# Patient Record
Sex: FEMALE | Race: WHITE | Employment: FULL TIME | URBAN - METROPOLITAN AREA
[De-identification: names, ages, dates, MRNs, and addresses within clinical notes are randomized per-mention and may not be internally consistent; named-entity substitution may affect disease eponyms.]

---

## 2017-04-27 ENCOUNTER — HOSPITAL ENCOUNTER (OUTPATIENT)
Dept: WOUND CARE | Age: 47
Discharge: HOME | End: 2017-04-27
Attending: SURGERY
Payer: COMMERCIAL

## 2017-04-27 PROCEDURE — 29580 STRAPPING UNNA BOOT: CPT

## 2017-04-27 PROCEDURE — 99215 OFFICE O/P EST HI 40 MIN: CPT

## 2017-05-05 ENCOUNTER — HOSPITAL ENCOUNTER (OUTPATIENT)
Dept: WOUND CARE | Age: 47
Discharge: HOME | End: 2017-05-05
Attending: SURGERY
Payer: COMMERCIAL

## 2017-05-05 PROCEDURE — 29580 STRAPPING UNNA BOOT: CPT

## 2017-05-11 PROCEDURE — 99213 OFFICE O/P EST LOW 20 MIN: CPT

## 2019-12-19 PROBLEM — E66.01 OBESITY, MORBID (HCC): Status: ACTIVE | Noted: 2019-12-19

## 2020-01-24 ENCOUNTER — HOSPITAL ENCOUNTER (OUTPATIENT)
Dept: MAMMOGRAPHY | Age: 50
Discharge: HOME | End: 2020-01-24
Attending: OBSTETRICS & GYNECOLOGY
Payer: COMMERCIAL

## 2020-01-24 DIAGNOSIS — Z12.39 SCREENING FOR BREAST CANCER: ICD-10-CM

## 2020-01-24 PROCEDURE — 77067 SCR MAMMO BI INCL CAD: CPT

## 2020-05-07 NOTE — H&P (VIEW-ONLY)
Gynecology History and Physical 
 
Name: Emi Servin MRN: [id number] SSN: [id number] YOB: 1970  Age: 52 y.o. Sex: female Subjective: Chief complaint:  Fibroids and Menorrhagia Madison Frankel is a 52 y.o.  female [de-identified] with a history of fibroids and menorrhagia. Gradually worsening course. Graylon Michael Previous workup included Ultrasound which revealed 12 cm fibroid. Previous treatment measures included lupron and aygestin. She is admitted for TLH. OB History   
0 Para Term  AB Living SAB  
   
 TAB Ectopic Molar Multiple Live Births Past Medical History:  
Diagnosis Date  Abnormal Papanicolaou smear of cervix  Cough 2016  Gilbert's syndrome  Headache  HPV (human papilloma virus) infection  Hypersomnia  Narcolepsy without cataplexy(347.00) 2016  Otitis media with effusion 2016 Past Surgical History:  
Procedure Laterality Date  HX CHOLECYSTECTOMY  HX COLPOSCOPY    
 HX LEEP PROCEDURE    HX OTHER SURGICAL    
 cyst on talebone Social History Occupational History  Not on file Tobacco Use  Smoking status: Never Smoker  Smokeless tobacco: Never Used Substance and Sexual Activity  Alcohol use: No  
 Drug use: No  
 Sexual activity: Not Currently Family History Problem Relation Age of Onset  Diabetes Father  Thyroid Disease Father  Heart Disease Father  Stroke Father  Cancer Maternal Grandmother   
     cancer of the [place] Other Mother Gilbert's syndrome  Thyroid Disease Brother  Diabetes Paternal Grandfather  Breast Cancer Neg Hx  Colon Cancer Neg Hx   
 Ovarian Cancer Neg Hx Allergies Allergen Reactions  Pcn [Penicillins] Other (comments) Prior to Admission medications Medication Sig Start Date End Date Taking? Authorizing Provider  
estradioL Rosia Merritts) 10 mcg tab vaginal tablet Insert 1 Tab into vagina daily. 5/5/20  Yes Wilfredo Guzman MD  
norethindrone acetate (AYGESTIN) 5 mg tablet Take 1/2 po every day along with the Lupron injection 1/27/20  Yes Wilfredo Guzman MD  
ergocalciferol (VITAMIN D2) 50,000 unit capsule Take 50,000 Units by mouth. Provider, Historical  
Omega-3-DHA-EPA-Fish Oil 1,000 mg (120 mg-180 mg) cap Take  by mouth. Provider, Historical  
fluticasone (FLONASE) 50 mcg/actuation nasal spray 2 Sprays by Both Nostrils route daily. Provider, Historical  
  
 
Review of Systems: A comprehensive review of systems was negative except for that written in the History of Present Illness. Objective:  
 
Vitals:  
 05/07/20 1508 BP: 110/72 Weight: 256 lb (116.1 kg) Height: 5' 4.5\" (1.638 m) Physical Exam: 
Patient without distress. Heart: Regular rate and rhythm Lung: clear to auscultation throughout lung fields, no wheezes, no rales, no rhonchi and normal respiratory effort Abdomen: soft, nontender Uterus: enlarged Assessment:  
 
  
Menorrhagia with large fibroids. Has been on lupron for 3 months. Plan:  
 
 
Discussed the risks of surgery including the risks of bleeding, infection, deep vein thrombosis, and surgical injuries to internal organs including but not limited to the bowels, bladder, ureters, rectum, and female reproductive organs. The patient understands the risks; any and all questions were answered to the patient's satisfaction. Aleida Wu

## 2020-05-08 ENCOUNTER — HOSPITAL ENCOUNTER (OUTPATIENT)
Dept: SURGERY | Age: 50
Discharge: HOME | End: 2020-05-08

## 2020-05-08 VITALS — HEIGHT: 65 IN | BODY MASS INDEX: 42.49 KG/M2 | WEIGHT: 255 LBS

## 2020-05-08 NOTE — PERIOP NOTES
Patient verified name and . Order for consent not found in EHR. Type 2 surgery, PAT phone assessment complete. Orders not received. Labs per surgeon: None  Labs per anesthesia protocol: Hemoglobin Patient instructed to come to [facility] entrance C then go to [address] [facility] [address] Monday [id number] to have blood drawn. No appointment necessary. Patient verbalized understanding      Patient states had COVID test done this am,      Patient answered medical/surgical history questions at their best of ability. All prior to admission medications documented in [facility]. Patient instructed to take the following medications the day of surgery according to anesthesia guidelines with a small sip of water:none Hold all vitamins 7 days prior to surgery and NSAIDS 5 days prior to surgery. Prescription meds to hold:none    Patient instructed on the following:  >A negative Covid swab result is required to proceed with surgery; the swab will be collected 4-5 days prior to surgery at the [address] [facility] at [address]. The patient will be contacted by the Covid swab team for an appointment date and time. For questions or concerns the patient should call [phone]. >No visitors at this time; patients will be dropped off at entrance. >Arrive at [facility], time of arrival to be called the day before by 1700  >NPO after midnight including gum, mints, and ice chips  >Responsible adult must drive patient to the hospital, stay during surgery, and patient will need supervision 24 hours after anesthesia  >Use hibiclens soap in shower the night before surgery and on the morning of surgery  >All piercings must be removed prior to arrival.    >Leave all valuables (money and jewelry) at home but bring insurance card and ID on       DOS. >Do not wear make-up, nail polish, lotions, cologne, perfumes, powders, or oil on skin.     Patient teach back successful and patient demonstrates knowledge of instruction.

## 2020-05-08 NOTE — PERIOP NOTES
Enhanced Recovery After GYN Surgery: non-diabetic patients    Patient states has been drinking Ensure and was instructed to stop Ensure 05/1/2020    Do not drink any Ensure Enlive the day before surgery 05/11/2020. Ensure Enlive is the preferred formula over other Ensure formulas as it is the only one that contains CaHMB which helps maintain and rebuild muscle health. It is  recommended that you continue drinking this for one month after surgery. The night before surgery 05/11/2020, drink 2 bottles of the Ensure Pre-Surgery drink. The morning of surgery 05/12/2020, drink one bottle of the Ensure Pre-Surgery drink while on  your way to the hospital. Drink this over 5-10 minutes. Drink nothing else after drinking the pre-surgical drink the morning of surgery. Bring your patient handbook with you to the hospital.    Things to remember:    1. You will be given clear liquids to drink, advancing diet as tolerated    2. You will be up and moving around with assistance 2-4 hours after surgery. 3. You will be given regularly scheduled pain medications (NSAIDS, Tylenol, Gabapentin) with narcotics as needed. 4. You may be able to go home that night if the surgeon okays and you are up and eating and drinking. Otherwise, your discharge will be the following morning around lunch time. 5. Continue drinking Ensure Enlive for 5 days after surgery.

## 2020-05-08 NOTE — PERIOP NOTES
PLEASE CONTINUE TAKING ALL PRESCRIPTION MEDICATIONS UP TO THE DAY OF SURGERY UNLESS OTHERWISE DIRECTED BELOW. DISCONTINUE all vitamins and supplements 7 days prior to surgery. DISCONTINUE Non-Steriodal Anti-Inflammatory (NSAIDS) such as Advil and Aleve 5 days prior to surgery. Home Medications to take  the day of surgery    None           Home Medications   to Hold   None        Comments         *Visitor policy of 0 visitor per patient discussed. Please do not bring home medications with you on the day of surgery unless otherwise directed by your nurse. If you are instructed to bring home medications, please give them to your nurse as they will be administered by the nursing staff. If you have any questions, please call [facility] [phone] or [facility] [phone]. A copy of this note was provided to the patient for reference.

## 2020-05-11 ENCOUNTER — HOSPITAL ENCOUNTER (OUTPATIENT)
Dept: LAB | Age: 50
Discharge: HOME | End: 2020-05-11
Payer: COMMERCIAL

## 2020-05-11 ENCOUNTER — ANESTHESIA EVENT (OUTPATIENT)
Dept: SURGERY | Age: 50
End: 2020-05-11
Payer: COMMERCIAL

## 2020-05-11 LAB — HGB BLD-MCNC: 14.9 G/DL (ref 11.7–15.4)

## 2020-05-11 PROCEDURE — 36415 COLL VENOUS BLD VENIPUNCTURE: CPT

## 2020-05-11 PROCEDURE — 85018 HEMOGLOBIN: CPT

## 2020-05-11 NOTE — PERIOP NOTES
Date: 4/30/2020 Department: [address] [place] [address] Ordering/Authorizing: Carolyn Shepherd MD   Component Value Flag Ref Range Units Status   SARS-CoV-2, ALEXANDER Not Detected

## 2020-05-11 NOTE — ANESTHESIA PREPROCEDURE EVALUATION
Relevant Problems   No relevant active problems       Anesthetic History               Review of Systems / Medical History  Patient summary reviewed and pertinent labs reviewed    Pulmonary  Within defined limits                 Neuro/Psych   Within defined limits           Cardiovascular                  Exercise tolerance: >4 METS     GI/Hepatic/Renal               Comments: Gilbert's Endo/Other        Morbid obesity     Other Findings   Comments: Narcolepsy         Physical Exam    Airway  Mallampati: III  TM Distance: 4 - 6 cm  Neck ROM: decreased range of motion   Mouth opening: Normal     Cardiovascular    Rhythm: regular  Rate: normal         Dental         Pulmonary  Breath sounds clear to auscultation               Abdominal         Other Findings            Anesthetic Plan    ASA: 3  Anesthesia type: general          Induction: Intravenous  Anesthetic plan and risks discussed with: Patient

## 2020-05-11 NOTE — PERIOP NOTES
HGB done today WNL    Recent Results (from the past 12 hour(s))   HEMOGLOBIN    Collection Time: 05/11/20  1:25 PM   Result Value Ref Range    HGB 14.9 11.7 - 15.4 g/dL

## 2020-05-11 NOTE — PERIOP NOTES
Your arrival time for surgery is 0530. You should have stopped drinking the Ensure Enlive (or Glucerna) after your second bottle yesterday. IF SURGEON PRESCRIBED A BOWEL PREP:    Follow your surgeon's instructions regarding your diet and bowel prep leading up to surgery. Before midnight tonight drink two bottles of the Pre-Surgical drink you were given at your Pre-Assessment appointment. Nothing to eat or drink after midnight until you are on the way to the hospital.  Drink one bottle of the pre-surgical drink over 5-10 minutes when close to arriving to the hospital.  If you were instructed to take any medication the morning of surgery do so before or with your Pre-Surgical drink. IF NO BOWEL PREP PRESCRIBED:    You may eat whatever you like up until midnight tonight. Before midnight drink two bottles of the Pre-Surgical drink you were given at your Pre-Assessment appointment. You may drink clear liquids (coffee or tea without cream, water, or apple juice) up until you leave for the hospital on day of surgery. When you are on the way to the hospital, drink one bottle of the Pre-Surgery drink over 5-10 minutes when you are close to arriving to the hospital.  Nothing by mouth after you complete the Pre-Surgery drink the morning of surgery. If you were instructed to take any medication the morning of surgery, do so before or with your Pre-Surgical drink.

## 2020-05-12 ENCOUNTER — ANESTHESIA (OUTPATIENT)
Dept: SURGERY | Age: 50
End: 2020-05-12
Payer: COMMERCIAL

## 2020-05-12 ENCOUNTER — HOSPITAL ENCOUNTER (OUTPATIENT)
Age: 50
Discharge: HOME | End: 2020-05-13
Attending: OBSTETRICS & GYNECOLOGY | Admitting: OBSTETRICS & GYNECOLOGY
Payer: COMMERCIAL

## 2020-05-12 DIAGNOSIS — Z90.710 S/P HYSTERECTOMY: Primary | ICD-10-CM

## 2020-05-12 PROBLEM — D25.1 INTRAMURAL LEIOMYOMA OF UTERUS: Status: ACTIVE | Noted: 2020-05-12

## 2020-05-12 LAB
ABO + RH BLD: NORMAL
BLOOD GROUP ANTIBODIES SERPL: NORMAL
HCG UR QL: NEGATIVE
SPECIMEN EXP DATE BLD: NORMAL

## 2020-05-12 PROCEDURE — 74011250636 HC RX REV CODE- 250/636: Performed by: ANESTHESIOLOGY

## 2020-05-12 PROCEDURE — 77030008756 HC TU IRR SUC STRY -B: Performed by: OBSTETRICS & GYNECOLOGY

## 2020-05-12 PROCEDURE — 77030019908 HC STETH ESOPH SIMS -A: Performed by: ANESTHESIOLOGY

## 2020-05-12 PROCEDURE — 77030038160 HC SHR COAG HARM J&J -F: Performed by: OBSTETRICS & GYNECOLOGY

## 2020-05-12 PROCEDURE — 86900 BLOOD TYPING SEROLOGIC ABO: CPT

## 2020-05-12 PROCEDURE — 77030040361 HC SLV COMPR DVT MDII -B

## 2020-05-12 PROCEDURE — 74011250636 HC RX REV CODE- 250/636: Performed by: OBSTETRICS & GYNECOLOGY

## 2020-05-12 PROCEDURE — 74011000250 HC RX REV CODE- 250: Performed by: NURSE ANESTHETIST, CERTIFIED REGISTERED

## 2020-05-12 PROCEDURE — 77030040830 HC CATH URETH FOL MDII -A: Performed by: OBSTETRICS & GYNECOLOGY

## 2020-05-12 PROCEDURE — 77030040922 HC BLNKT HYPOTHRM STRY -A: Performed by: ANESTHESIOLOGY

## 2020-05-12 PROCEDURE — 77030014063 HC TIP MANIP UTER COOP -B: Performed by: OBSTETRICS & GYNECOLOGY

## 2020-05-12 PROCEDURE — 77030027138 HC INCENT SPIROMETER -A

## 2020-05-12 PROCEDURE — 77030018836 HC SOL IRR NACL ICUM -A: Performed by: OBSTETRICS & GYNECOLOGY

## 2020-05-12 PROCEDURE — 77030020829: Performed by: OBSTETRICS & GYNECOLOGY

## 2020-05-12 PROCEDURE — 77030018804 HC PRT GELPNT SYS AMR -F: Performed by: OBSTETRICS & GYNECOLOGY

## 2020-05-12 PROCEDURE — 77030018720 HC DVC LIGSR ATLS COVD -E: Performed by: OBSTETRICS & GYNECOLOGY

## 2020-05-12 PROCEDURE — 77030012770 HC TRCR OPT FX AMR -B: Performed by: OBSTETRICS & GYNECOLOGY

## 2020-05-12 PROCEDURE — 81025 URINE PREGNANCY TEST: CPT

## 2020-05-12 PROCEDURE — 77030002974 HC SUT PLN J&J -A: Performed by: OBSTETRICS & GYNECOLOGY

## 2020-05-12 PROCEDURE — 77030008606 HC TRCR ENDOSC KII AMR -B: Performed by: OBSTETRICS & GYNECOLOGY

## 2020-05-12 PROCEDURE — 76210000016 HC OR PH I REC 1 TO 1.5 HR: Performed by: OBSTETRICS & GYNECOLOGY

## 2020-05-12 PROCEDURE — 77030034696 HC CATH URETH FOL 2W BARD -A: Performed by: OBSTETRICS & GYNECOLOGY

## 2020-05-12 PROCEDURE — 77030037088 HC TUBE ENDOTRACH ORAL NSL COVD-A: Performed by: ANESTHESIOLOGY

## 2020-05-12 PROCEDURE — 77030027743 HC APPL F/HEMSTAT BARD -B: Performed by: OBSTETRICS & GYNECOLOGY

## 2020-05-12 PROCEDURE — 77030034154 HC SHR COAG HARM ACE J&J -F: Performed by: OBSTETRICS & GYNECOLOGY

## 2020-05-12 PROCEDURE — 77030040361 HC SLV COMPR DVT MDII -B: Performed by: OBSTETRICS & GYNECOLOGY

## 2020-05-12 PROCEDURE — C2628 CATHETER, OCCLUSION: HCPCS | Performed by: OBSTETRICS & GYNECOLOGY

## 2020-05-12 PROCEDURE — 76010000173 HC OR TIME 3 TO 3.5 HR INTENSV-TIER 1: Performed by: OBSTETRICS & GYNECOLOGY

## 2020-05-12 PROCEDURE — 77030003029 HC SUT VCRL J&J -B: Performed by: OBSTETRICS & GYNECOLOGY

## 2020-05-12 PROCEDURE — 74011000250 HC RX REV CODE- 250: Performed by: OBSTETRICS & GYNECOLOGY

## 2020-05-12 PROCEDURE — 77030022704 HC SUT VLOC COVD -B: Performed by: OBSTETRICS & GYNECOLOGY

## 2020-05-12 PROCEDURE — 77030010032 HC SCLPL DISECT HARM J&J -C: Performed by: OBSTETRICS & GYNECOLOGY

## 2020-05-12 PROCEDURE — 74011250637 HC RX REV CODE- 250/637: Performed by: ANESTHESIOLOGY

## 2020-05-12 PROCEDURE — 77030027744 HC PWDR HEMSTAT ARISTA ABSRB 5GM BARD -D: Performed by: OBSTETRICS & GYNECOLOGY

## 2020-05-12 PROCEDURE — 77030008522 HC TBNG INSUF LAPRO STRY -B: Performed by: OBSTETRICS & GYNECOLOGY

## 2020-05-12 PROCEDURE — 77030009957 HC RELD ENDOSTCH COVD -C: Performed by: OBSTETRICS & GYNECOLOGY

## 2020-05-12 PROCEDURE — 77030010507 HC ADH SKN DERMBND J&J -B: Performed by: OBSTETRICS & GYNECOLOGY

## 2020-05-12 PROCEDURE — 77030000038 HC TIP SCIS LAPSCP SURI -B: Performed by: OBSTETRICS & GYNECOLOGY

## 2020-05-12 PROCEDURE — 88307 TISSUE EXAM BY PATHOLOGIST: CPT

## 2020-05-12 PROCEDURE — 77030034849: Performed by: OBSTETRICS & GYNECOLOGY

## 2020-05-12 PROCEDURE — 77030039425 HC BLD LARYNG TRULITE DISP TELE -A: Performed by: ANESTHESIOLOGY

## 2020-05-12 PROCEDURE — 77030018875 HC APPL CLP LIG4 J&J -B: Performed by: OBSTETRICS & GYNECOLOGY

## 2020-05-12 PROCEDURE — 77030003578 HC NDL INSUF VERES AMR -B: Performed by: OBSTETRICS & GYNECOLOGY

## 2020-05-12 PROCEDURE — 74011250636 HC RX REV CODE- 250/636: Performed by: NURSE ANESTHETIST, CERTIFIED REGISTERED

## 2020-05-12 PROCEDURE — 74011250637 HC RX REV CODE- 250/637: Performed by: OBSTETRICS & GYNECOLOGY

## 2020-05-12 PROCEDURE — 77030031139 HC SUT VCRL2 J&J -A: Performed by: OBSTETRICS & GYNECOLOGY

## 2020-05-12 PROCEDURE — 76060000037 HC ANESTHESIA 3 TO 3.5 HR: Performed by: OBSTETRICS & GYNECOLOGY

## 2020-05-12 RX ORDER — IBUPROFEN 800 MG/1
800 TABLET ORAL
Qty: 60 TAB | Refills: 0 | Status: SHIPPED | OUTPATIENT
Start: 2020-05-12 | End: 2022-02-21 | Stop reason: ALTCHOICE

## 2020-05-12 RX ORDER — PROPOFOL 10 MG/ML
INJECTION, EMULSION INTRAVENOUS AS NEEDED
Status: DISCONTINUED | OUTPATIENT
Start: 2020-05-12 | End: 2020-05-12 | Stop reason: HOSPADM

## 2020-05-12 RX ORDER — OXYCODONE HYDROCHLORIDE 5 MG/1
10 TABLET ORAL
Status: DISCONTINUED | OUTPATIENT
Start: 2020-05-12 | End: 2020-05-12 | Stop reason: HOSPADM

## 2020-05-12 RX ORDER — ACETAMINOPHEN 500 MG
1000 TABLET ORAL EVERY 6 HOURS
Status: DISCONTINUED | OUTPATIENT
Start: 2020-05-12 | End: 2020-05-13 | Stop reason: HOSPADM

## 2020-05-12 RX ORDER — ACETAMINOPHEN 500 MG
1000 TABLET ORAL ONCE
Status: COMPLETED | OUTPATIENT
Start: 2020-05-12 | End: 2020-05-12

## 2020-05-12 RX ORDER — MIDAZOLAM HYDROCHLORIDE 1 MG/ML
2 INJECTION, SOLUTION INTRAMUSCULAR; INTRAVENOUS ONCE
Status: COMPLETED | OUTPATIENT
Start: 2020-05-12 | End: 2020-05-12

## 2020-05-12 RX ORDER — SODIUM CHLORIDE 0.9 % (FLUSH) 0.9 %
5-40 SYRINGE (ML) INJECTION AS NEEDED
Status: DISCONTINUED | OUTPATIENT
Start: 2020-05-12 | End: 2020-05-12 | Stop reason: HOSPADM

## 2020-05-12 RX ORDER — NALOXONE HYDROCHLORIDE 0.4 MG/ML
0.2 INJECTION, SOLUTION INTRAMUSCULAR; INTRAVENOUS; SUBCUTANEOUS AS NEEDED
Status: DISCONTINUED | OUTPATIENT
Start: 2020-05-12 | End: 2020-05-12 | Stop reason: HOSPADM

## 2020-05-12 RX ORDER — HYDROMORPHONE HYDROCHLORIDE 2 MG/ML
0.5 INJECTION, SOLUTION INTRAMUSCULAR; INTRAVENOUS; SUBCUTANEOUS
Status: DISCONTINUED | OUTPATIENT
Start: 2020-05-12 | End: 2020-05-12 | Stop reason: HOSPADM

## 2020-05-12 RX ORDER — SODIUM CHLORIDE 0.9 % (FLUSH) 0.9 %
5-40 SYRINGE (ML) INJECTION EVERY 8 HOURS
Status: DISCONTINUED | OUTPATIENT
Start: 2020-05-12 | End: 2020-05-12 | Stop reason: HOSPADM

## 2020-05-12 RX ORDER — FENTANYL CITRATE 50 UG/ML
INJECTION, SOLUTION INTRAMUSCULAR; INTRAVENOUS AS NEEDED
Status: DISCONTINUED | OUTPATIENT
Start: 2020-05-12 | End: 2020-05-12 | Stop reason: HOSPADM

## 2020-05-12 RX ORDER — CEFAZOLIN SODIUM/WATER 2 G/20 ML
2 SYRINGE (ML) INTRAVENOUS ONCE
Status: COMPLETED | OUTPATIENT
Start: 2020-05-12 | End: 2020-05-12

## 2020-05-12 RX ORDER — MIDAZOLAM HYDROCHLORIDE 1 MG/ML
2 INJECTION, SOLUTION INTRAMUSCULAR; INTRAVENOUS
Status: DISCONTINUED | OUTPATIENT
Start: 2020-05-12 | End: 2020-05-12 | Stop reason: HOSPADM

## 2020-05-12 RX ORDER — OXYCODONE AND ACETAMINOPHEN 5; 325 MG/1; MG/1
1 TABLET ORAL
Qty: 12 TAB | Refills: 0 | Status: SHIPPED | OUTPATIENT
Start: 2020-05-12 | End: 2020-05-17

## 2020-05-12 RX ORDER — FLUTICASONE PROPIONATE 50 MCG
2 SPRAY, SUSPENSION (ML) NASAL DAILY
Status: DISCONTINUED | OUTPATIENT
Start: 2020-05-13 | End: 2020-05-13 | Stop reason: HOSPADM

## 2020-05-12 RX ORDER — GABAPENTIN 300 MG/1
300 CAPSULE ORAL 3 TIMES DAILY
Status: DISCONTINUED | OUTPATIENT
Start: 2020-05-12 | End: 2020-05-13 | Stop reason: HOSPADM

## 2020-05-12 RX ORDER — SODIUM CHLORIDE, SODIUM LACTATE, POTASSIUM CHLORIDE, CALCIUM CHLORIDE 600; 310; 30; 20 MG/100ML; MG/100ML; MG/100ML; MG/100ML
40 INJECTION, SOLUTION INTRAVENOUS CONTINUOUS
Status: DISCONTINUED | OUTPATIENT
Start: 2020-05-12 | End: 2020-05-13 | Stop reason: HOSPADM

## 2020-05-12 RX ORDER — ONDANSETRON 4 MG/1
4 TABLET, ORALLY DISINTEGRATING ORAL
Status: DISCONTINUED | OUTPATIENT
Start: 2020-05-12 | End: 2020-05-13 | Stop reason: HOSPADM

## 2020-05-12 RX ORDER — NEOSTIGMINE METHYLSULFATE 1 MG/ML
INJECTION, SOLUTION INTRAVENOUS AS NEEDED
Status: DISCONTINUED | OUTPATIENT
Start: 2020-05-12 | End: 2020-05-12 | Stop reason: HOSPADM

## 2020-05-12 RX ORDER — KETOROLAC TROMETHAMINE 30 MG/ML
30 INJECTION, SOLUTION INTRAMUSCULAR; INTRAVENOUS EVERY 6 HOURS
Status: COMPLETED | OUTPATIENT
Start: 2020-05-12 | End: 2020-05-13

## 2020-05-12 RX ORDER — ONDANSETRON 2 MG/ML
INJECTION INTRAMUSCULAR; INTRAVENOUS AS NEEDED
Status: DISCONTINUED | OUTPATIENT
Start: 2020-05-12 | End: 2020-05-12 | Stop reason: HOSPADM

## 2020-05-12 RX ORDER — KETAMINE HYDROCHLORIDE 50 MG/ML
INJECTION, SOLUTION INTRAMUSCULAR; INTRAVENOUS AS NEEDED
Status: DISCONTINUED | OUTPATIENT
Start: 2020-05-12 | End: 2020-05-12 | Stop reason: HOSPADM

## 2020-05-12 RX ORDER — FLUMAZENIL 0.1 MG/ML
0.2 INJECTION INTRAVENOUS
Status: DISCONTINUED | OUTPATIENT
Start: 2020-05-12 | End: 2020-05-12 | Stop reason: HOSPADM

## 2020-05-12 RX ORDER — SODIUM CHLORIDE, SODIUM LACTATE, POTASSIUM CHLORIDE, CALCIUM CHLORIDE 600; 310; 30; 20 MG/100ML; MG/100ML; MG/100ML; MG/100ML
100 INJECTION, SOLUTION INTRAVENOUS CONTINUOUS
Status: DISCONTINUED | OUTPATIENT
Start: 2020-05-12 | End: 2020-05-12 | Stop reason: HOSPADM

## 2020-05-12 RX ORDER — DIPHENHYDRAMINE HYDROCHLORIDE 50 MG/ML
12.5 INJECTION, SOLUTION INTRAMUSCULAR; INTRAVENOUS
Status: DISCONTINUED | OUTPATIENT
Start: 2020-05-12 | End: 2020-05-12 | Stop reason: HOSPADM

## 2020-05-12 RX ORDER — KETOROLAC TROMETHAMINE 30 MG/ML
INJECTION, SOLUTION INTRAMUSCULAR; INTRAVENOUS AS NEEDED
Status: DISCONTINUED | OUTPATIENT
Start: 2020-05-12 | End: 2020-05-12 | Stop reason: HOSPADM

## 2020-05-12 RX ORDER — ENOXAPARIN SODIUM 100 MG/ML
40 INJECTION SUBCUTANEOUS ONCE
Status: COMPLETED | OUTPATIENT
Start: 2020-05-12 | End: 2020-05-12

## 2020-05-12 RX ORDER — GABAPENTIN 300 MG/1
300 CAPSULE ORAL ONCE
Status: COMPLETED | OUTPATIENT
Start: 2020-05-12 | End: 2020-05-12

## 2020-05-12 RX ORDER — OXYCODONE HYDROCHLORIDE 5 MG/1
5 TABLET ORAL
Status: DISCONTINUED | OUTPATIENT
Start: 2020-05-12 | End: 2020-05-12 | Stop reason: HOSPADM

## 2020-05-12 RX ORDER — BUPIVACAINE HYDROCHLORIDE 5 MG/ML
INJECTION, SOLUTION EPIDURAL; INTRACAUDAL AS NEEDED
Status: DISCONTINUED | OUTPATIENT
Start: 2020-05-12 | End: 2020-05-12 | Stop reason: HOSPADM

## 2020-05-12 RX ORDER — CELECOXIB 100 MG/1
100 CAPSULE ORAL 2 TIMES DAILY
Status: DISCONTINUED | OUTPATIENT
Start: 2020-05-14 | End: 2020-05-13 | Stop reason: HOSPADM

## 2020-05-12 RX ORDER — NALOXONE HYDROCHLORIDE 0.4 MG/ML
0.4 INJECTION, SOLUTION INTRAMUSCULAR; INTRAVENOUS; SUBCUTANEOUS AS NEEDED
Status: DISCONTINUED | OUTPATIENT
Start: 2020-05-12 | End: 2020-05-13 | Stop reason: HOSPADM

## 2020-05-12 RX ORDER — LIDOCAINE HYDROCHLORIDE 10 MG/ML
0.1 INJECTION INFILTRATION; PERINEURAL AS NEEDED
Status: DISCONTINUED | OUTPATIENT
Start: 2020-05-12 | End: 2020-05-12 | Stop reason: HOSPADM

## 2020-05-12 RX ORDER — DEXAMETHASONE SODIUM PHOSPHATE 4 MG/ML
INJECTION, SOLUTION INTRA-ARTICULAR; INTRALESIONAL; INTRAMUSCULAR; INTRAVENOUS; SOFT TISSUE AS NEEDED
Status: DISCONTINUED | OUTPATIENT
Start: 2020-05-12 | End: 2020-05-12 | Stop reason: HOSPADM

## 2020-05-12 RX ORDER — OXYCODONE HYDROCHLORIDE 5 MG/1
5-10 TABLET ORAL
Status: DISCONTINUED | OUTPATIENT
Start: 2020-05-12 | End: 2020-05-13 | Stop reason: HOSPADM

## 2020-05-12 RX ORDER — FENTANYL CITRATE 50 UG/ML
100 INJECTION, SOLUTION INTRAMUSCULAR; INTRAVENOUS ONCE
Status: DISCONTINUED | OUTPATIENT
Start: 2020-05-12 | End: 2020-05-12 | Stop reason: HOSPADM

## 2020-05-12 RX ORDER — GLYCOPYRROLATE 0.2 MG/ML
INJECTION INTRAMUSCULAR; INTRAVENOUS AS NEEDED
Status: DISCONTINUED | OUTPATIENT
Start: 2020-05-12 | End: 2020-05-12 | Stop reason: HOSPADM

## 2020-05-12 RX ORDER — ROCURONIUM BROMIDE 10 MG/ML
INJECTION, SOLUTION INTRAVENOUS AS NEEDED
Status: DISCONTINUED | OUTPATIENT
Start: 2020-05-12 | End: 2020-05-12 | Stop reason: HOSPADM

## 2020-05-12 RX ORDER — LIDOCAINE HYDROCHLORIDE 20 MG/ML
INJECTION, SOLUTION EPIDURAL; INFILTRATION; INTRACAUDAL; PERINEURAL AS NEEDED
Status: DISCONTINUED | OUTPATIENT
Start: 2020-05-12 | End: 2020-05-12 | Stop reason: HOSPADM

## 2020-05-12 RX ADMIN — ROCURONIUM BROMIDE 10 MG: 10 INJECTION, SOLUTION INTRAVENOUS at 09:07

## 2020-05-12 RX ADMIN — ROCURONIUM BROMIDE 5 MG: 10 INJECTION, SOLUTION INTRAVENOUS at 10:10

## 2020-05-12 RX ADMIN — GABAPENTIN 300 MG: 300 CAPSULE ORAL at 06:11

## 2020-05-12 RX ADMIN — PHENYLEPHRINE HYDROCHLORIDE 50 MCG: 10 INJECTION INTRAVENOUS at 09:01

## 2020-05-12 RX ADMIN — SODIUM CHLORIDE, SODIUM LACTATE, POTASSIUM CHLORIDE, AND CALCIUM CHLORIDE 100 ML/HR: 600; 310; 30; 20 INJECTION, SOLUTION INTRAVENOUS at 06:15

## 2020-05-12 RX ADMIN — PHENYLEPHRINE HYDROCHLORIDE 100 MCG: 10 INJECTION INTRAVENOUS at 10:01

## 2020-05-12 RX ADMIN — ACETAMINOPHEN 1000 MG: 500 TABLET, FILM COATED ORAL at 18:00

## 2020-05-12 RX ADMIN — PHENYLEPHRINE HYDROCHLORIDE 50 MCG: 10 INJECTION INTRAVENOUS at 08:25

## 2020-05-12 RX ADMIN — KETOROLAC TROMETHAMINE 30 MG: 30 INJECTION, SOLUTION INTRAMUSCULAR; INTRAVENOUS at 10:29

## 2020-05-12 RX ADMIN — KETOROLAC TROMETHAMINE 30 MG: 30 INJECTION, SOLUTION INTRAMUSCULAR at 16:45

## 2020-05-12 RX ADMIN — ENOXAPARIN SODIUM 40 MG: 40 INJECTION SUBCUTANEOUS at 06:54

## 2020-05-12 RX ADMIN — LIDOCAINE HYDROCHLORIDE 100 MG: 20 INJECTION, SOLUTION EPIDURAL; INFILTRATION; INTRACAUDAL; PERINEURAL at 07:30

## 2020-05-12 RX ADMIN — KETOROLAC TROMETHAMINE 30 MG: 30 INJECTION, SOLUTION INTRAMUSCULAR at 22:03

## 2020-05-12 RX ADMIN — DEXAMETHASONE SODIUM PHOSPHATE 10 MG: 4 INJECTION, SOLUTION INTRAMUSCULAR; INTRAVENOUS at 07:42

## 2020-05-12 RX ADMIN — MIDAZOLAM 2 MG: 1 INJECTION INTRAMUSCULAR; INTRAVENOUS at 07:18

## 2020-05-12 RX ADMIN — SODIUM CHLORIDE, SODIUM LACTATE, POTASSIUM CHLORIDE, AND CALCIUM CHLORIDE 40 ML/HR: 600; 310; 30; 20 INJECTION, SOLUTION INTRAVENOUS at 22:03

## 2020-05-12 RX ADMIN — PHENYLEPHRINE HYDROCHLORIDE 50 MCG: 10 INJECTION INTRAVENOUS at 07:51

## 2020-05-12 RX ADMIN — ACETAMINOPHEN 1000 MG: 500 TABLET, FILM COATED ORAL at 06:11

## 2020-05-12 RX ADMIN — Medication 2 G: at 07:43

## 2020-05-12 RX ADMIN — GABAPENTIN 300 MG: 300 CAPSULE ORAL at 16:45

## 2020-05-12 RX ADMIN — Medication 3 MG: at 10:28

## 2020-05-12 RX ADMIN — PHENYLEPHRINE HYDROCHLORIDE 50 MCG: 10 INJECTION INTRAVENOUS at 08:30

## 2020-05-12 RX ADMIN — ROCURONIUM BROMIDE 10 MG: 10 INJECTION, SOLUTION INTRAVENOUS at 08:08

## 2020-05-12 RX ADMIN — FENTANYL CITRATE 100 MCG: 50 INJECTION INTRAMUSCULAR; INTRAVENOUS at 07:30

## 2020-05-12 RX ADMIN — PHENYLEPHRINE HYDROCHLORIDE 100 MCG: 10 INJECTION INTRAVENOUS at 10:07

## 2020-05-12 RX ADMIN — ROCURONIUM BROMIDE 5 MG: 10 INJECTION, SOLUTION INTRAVENOUS at 09:24

## 2020-05-12 RX ADMIN — GLYCOPYRROLATE 0.4 MG: 0.2 INJECTION, SOLUTION INTRAMUSCULAR; INTRAVENOUS at 10:28

## 2020-05-12 RX ADMIN — GABAPENTIN 300 MG: 300 CAPSULE ORAL at 21:49

## 2020-05-12 RX ADMIN — SODIUM CHLORIDE, SODIUM LACTATE, POTASSIUM CHLORIDE, AND CALCIUM CHLORIDE: 600; 310; 30; 20 INJECTION, SOLUTION INTRAVENOUS at 09:28

## 2020-05-12 RX ADMIN — ROCURONIUM BROMIDE 50 MG: 10 INJECTION, SOLUTION INTRAVENOUS at 07:30

## 2020-05-12 RX ADMIN — PHENYLEPHRINE HYDROCHLORIDE 100 MCG: 10 INJECTION INTRAVENOUS at 08:46

## 2020-05-12 RX ADMIN — ACETAMINOPHEN 1000 MG: 500 TABLET, FILM COATED ORAL at 23:48

## 2020-05-12 RX ADMIN — PROPOFOL 200 MG: 10 INJECTION, EMULSION INTRAVENOUS at 07:30

## 2020-05-12 RX ADMIN — ACETAMINOPHEN 1000 MG: 500 TABLET, FILM COATED ORAL at 13:23

## 2020-05-12 RX ADMIN — ROCURONIUM BROMIDE 5 MG: 10 INJECTION, SOLUTION INTRAVENOUS at 09:38

## 2020-05-12 RX ADMIN — PHENYLEPHRINE HYDROCHLORIDE 100 MCG: 10 INJECTION INTRAVENOUS at 07:38

## 2020-05-12 RX ADMIN — ROCURONIUM BROMIDE 10 MG: 10 INJECTION, SOLUTION INTRAVENOUS at 08:51

## 2020-05-12 RX ADMIN — SODIUM CHLORIDE, SODIUM LACTATE, POTASSIUM CHLORIDE, AND CALCIUM CHLORIDE 40 ML/HR: 600; 310; 30; 20 INJECTION, SOLUTION INTRAVENOUS at 13:24

## 2020-05-12 RX ADMIN — KETAMINE HYDROCHLORIDE 60 MG: 50 INJECTION INTRAMUSCULAR; INTRAVENOUS at 07:35

## 2020-05-12 RX ADMIN — PHENYLEPHRINE HYDROCHLORIDE 100 MCG: 10 INJECTION INTRAVENOUS at 07:35

## 2020-05-12 RX ADMIN — KETAMINE HYDROCHLORIDE 30 MG: 50 INJECTION INTRAMUSCULAR; INTRAVENOUS at 08:30

## 2020-05-12 RX ADMIN — ONDANSETRON 4 MG: 2 INJECTION INTRAMUSCULAR; INTRAVENOUS at 10:16

## 2020-05-12 NOTE — INTERVAL H&P NOTE
Update History & Physical    The Patient's History and Physical above was reviewed with the patient and I examined the patient. There was no change. The surgical site was confirmed by the patient and me. R/B ovarian removal discussed again, recommend against removal in patients with no FH of ovarian Ca. Pt voices understanding. Plan:  The risk, benefits, expected outcome, and alternative to the recommended procedure have been discussed with the patient. Patient understands and wants to proceed with the procedure.     Electronically signed by Tyler Israel MD on 5/12/2020 at 7:12 AM

## 2020-05-12 NOTE — PROGRESS NOTES
05/12/20 1347   Incentive Spirometry Treatment   Level of Service Initial   Predicted Volume (ml) 1500 ml   Actual Volume (ml) 1500 ml   % Predicted Volume (ml) 1   Treatment Tolerance Well

## 2020-05-12 NOTE — PROGRESS NOTES
Problem: Vaginal Hysterectomy: Day of Surgery  Goal: Activity/Safety  Outcome: Progressing Towards Goal  Goal: Consults, if ordered  Outcome: Progressing Towards Goal  Goal: Diagnostic Test/Procedures  Outcome: Progressing Towards Goal  Goal: Nutrition/Diet  Outcome: Progressing Towards Goal  Goal: Discharge Planning  Outcome: Progressing Towards Goal  Goal: Medications  Outcome: Progressing Towards Goal  Goal: Respiratory  Outcome: Progressing Towards Goal  Goal: Treatments/Interventions/Procedures  Outcome: Progressing Towards Goal  Goal: Psychosocial  Outcome: Progressing Towards Goal  Goal: *Hemodynamically stable  Outcome: Progressing Towards Goal  Goal: *Optimal pain control at patient's stated goal  Outcome: Progressing Towards Goal  Goal: *Absence of infection signs and symptoms  Outcome: Progressing Towards Goal     Problem: Pain  Goal: *Control of Pain  Outcome: Progressing Towards Goal

## 2020-05-12 NOTE — PROGRESS NOTES
Patient drank Pre-Surgical drink as instructed:   Pre Surgical drink consumed over 5-10 minutes PTA DOS    Warmer placed on patient's bed. Warm IV fluids infusing in pre-op per ERAS protocol.

## 2020-05-12 NOTE — PROGRESS NOTES
Shift assessment complete. Pt resting in bed. A&Ox4. Respirations present, even, unlabored. Lung sounds clear to auscultation. HR regular, S1&S2 auscultated. 3 puncture sites to abdomen c/d/i. Abdomen soft with active bowel sounds in all 4 quadrants. IV capped and patent. Bilateral SCDs in place. Bed in lowest position, call light within reach, side rails x3. Encouraged to call for help when needed.

## 2020-05-12 NOTE — PERIOP NOTES
TRANSFER - OUT REPORT:    Verbal report given to receiving nurse(name) on Aura Frias being transferred to [facility](unit) for routine progression of care       Report consisted of patient's Situation, Background, Assessment and   Recommendations(SBAR). Information from the following report(s) OR Summary, Intake/Output and MAR was reviewed with the receiving nurse. Opportunity for questions and clarification was provided.       Patient transported with:   O2 @ 2 liters  Tech

## 2020-05-12 NOTE — ANESTHESIA POSTPROCEDURE EVALUATION
Procedure(s): HYSTERECTOMY TOTAL LAPAROSCOPIC, bilateral salpingectomy. Harriet Abdelrahman     general    Anesthesia Post Evaluation      Multimodal analgesia: multimodal analgesia used between 6 hours prior to anesthesia start to PACU discharge  Patient location during evaluation: PACU  Patient participation: complete - patient participated  Level of consciousness: awake and alert  Pain management: adequate  Airway patency: patent  Anesthetic complications: no  Cardiovascular status: acceptable and hemodynamically stable  Respiratory status: acceptable  Hydration status: acceptable        Vitals Value Taken Time   /68 5/12/2020 11:14 AM   Temp 37 °C (98.6 °F) 5/12/2020 10:44 AM   Pulse 64 5/12/2020 11:14 AM   Resp 15 5/12/2020 11:14 AM   SpO2 97 % 5/12/2020 11:14 AM

## 2020-05-12 NOTE — BRIEF OP NOTE
Brief Postoperative Note    Patient: Felipa Guzman  YOB: 1970  MRN: [id number]    Date of Procedure: 5/12/2020     Pre-Op Diagnosis: Uterine leiomyoma, unspecified location [D25.9]  Menorrhagia with irregular cycle [N92.1]    Post-Op Diagnosis: Same as preoperative diagnosis. Procedure(s): HYSTERECTOMY TOTAL LAPAROSCOPIC, bilateral salpingectomy. Surgeon(s):  Tracy Kennedy MD    Surgical Assistant: None    Anesthesia: General     Estimated Blood Loss (mL): 713 mL    Complications: None    Specimens:   ID Type Source Tests Collected by Time Destination   1 : Uterus with bialteral fallopian tubes Fresh Uterus with Bilateral Fallopian Tubes  Tracy Kennedy MD 5/12/2020 1015 Pathology        Implants: * No implants in log *    Drains: * No LDAs found *    Findings: Large fibroid uterus, ~ 700 grams. NL appearing ovaries.     Electronically Signed by Joao Padilla MD on 5/12/2020 at 10:38 AM

## 2020-05-13 VITALS
OXYGEN SATURATION: 97 % | TEMPERATURE: 98 F | HEART RATE: 78 BPM | WEIGHT: 250 LBS | DIASTOLIC BLOOD PRESSURE: 69 MMHG | RESPIRATION RATE: 20 BRPM | BODY MASS INDEX: 42.25 KG/M2 | SYSTOLIC BLOOD PRESSURE: 116 MMHG

## 2020-05-13 PROCEDURE — 74011250636 HC RX REV CODE- 250/636: Performed by: OBSTETRICS & GYNECOLOGY

## 2020-05-13 PROCEDURE — 74011250637 HC RX REV CODE- 250/637: Performed by: OBSTETRICS & GYNECOLOGY

## 2020-05-13 RX ADMIN — KETOROLAC TROMETHAMINE 30 MG: 30 INJECTION, SOLUTION INTRAMUSCULAR at 06:09

## 2020-05-13 RX ADMIN — ACETAMINOPHEN 1000 MG: 500 TABLET, FILM COATED ORAL at 10:08

## 2020-05-13 RX ADMIN — KETOROLAC TROMETHAMINE 30 MG: 30 INJECTION, SOLUTION INTRAMUSCULAR at 10:32

## 2020-05-13 NOTE — OP NOTES
Operative Note    Patient: Viet De La Rosa MRN: [id number]  SSN: [id number]    YOB: 1970  Age: 52 y.o. Sex: female      Date of Surgery: 5/12/2020      Preoperative Diagnosis: Uterine leiomyoma, unspecified location [D25.9]  Menorrhagia with irregular cycle [N92.1]    Postoperative Diagnosis: Uterine leiomyoma, unspecified location [D25.9]  Menorrhagia with irregular cycle [N92.1]    Surgeon(s):  Ashutosh Soto MD    Anesthesia: General    Procedure: Total Laparoscopic Hysterectomy greater than 250 grams, WITH Bilateral Salpingectomy    Findings: fibroid uterus, enlarged uterus and normal BSO. Uterine specimen weighed ~ 650 grams after removal    Estimated Blood Loss:  150 mL    Drains: none    Specimens:    ID Type Source Tests Collected by Time Destination   1 : Uterus with bialteral fallopian tubes Fresh Uterus with Bilateral Fallopian Tubes  Ashutosh Soto MD 5/12/2020 1015 Pathology       DVT Prophylaxis: SCD Hose    Antibiotic Prophylaxis:  Ancef    Procedure Details: The patient was taken to the operating room with intravenous fluids running, where she was administered general anesthesia in the supine position. A urinary catheter was placed in the bladder using sterile technique. She was then placed in the dorsal lithotomy position and prepped and draped in usual sterile fashion. Time out was done to confirm the operating procedure, surgeon, patient, pertinent data, and site. Once confirmed by the team, the procedure was started. A weighted speculum was placed in the vagina and the anterior aspect of the cervix was grasped with a tenaculum. The  cervix dilated to 24 [place]. The Koh-Ring was placed and balloon inflated. Speculum and tenaculum removed. Supra-umbilical area was preinjected with approximately 2 cc of half percent plain Marcaine. An incision was made with the knife. Veress needle was placed in the incision and pneumoperitoneum was created with an opening pressure of 5 mmHg. The Veress needle was then removed. 5 mm trocar was inserted. The scope was placed through the trocar and intra-abdominal placement was verified. There was no bleeding and no evidence of injury to the bowel. 10 mm blunt trocars were placed in the left and right lower quadrants, and a 5mm suprapubic trocar in the same fashion, but under direct visualization. Approximately 10 cc of half percent plain Marcaine was used in total. Findings were noted as above. The Harmonic Ace and LigaSure were used. The ureters were identified on either side. Each tube was grasped, elevated and dissected free across the tubo-ovarian ligament, mesosalpinx and then proximal tube and withdrawn through the trocar. Next, On each side, the ovarian ligament was clamped, coagulated, and divided using the LigaSure. The dissection was then taken down through the round ligament. The anterior and posterior peritoneum was taken down on each side with harmonic, skeletonizing the uterine arteries. Bladder was dissected down below the ring. The uterine arteries were clamped, coagulated, and cut across the ascending branches with Ligasure. Cardinal ligament was developed. Anterior and posterior colpotomies were made with the Harmonic Ace. The remainder of the cardinal and uterosacral ligaments were serially clamped, coagulated, and cut. When the specimen was free, it was placed in the upper abdomen. The vagina was occluded with an Ascepto Bulb to assist with maintaining the pneumoperitoneum. The vaginal cuff angles were secured to the ipsilateral uterosacral ligaments and the vaginal cuff was closed with a 0 V-Loc running stitches. The bulb was removed from her vagina. The suprapubic incision was extended, lorena retractor placed, large retrieval bag inserted, and gelport attached. Pneumo recreated, and the specimen placed within the bag.   The bag withdrawn through the lorena and pneumo recreated to create space between the bag and abdominal contents. The bag opened extracorporeally and the specimen morcellated using the excite technique. The was tedious due to the large fibroid having been treated with lupron but accomplished without complication. Gellport replaced and laparoscopy performed. No evidence of visceral injury. Both ureters traced and peristalsis noted throughout their courses. Hemostasis was achieved. The pelvis was irrigated with copious amounts of saline and suctioned away. The pneumoperitoneum was reduced to below 8 mmHg for several minutes, watching for bleeding. Hemostasis was assured. The pneumoperitoneum was reduced and the left and right lower trocars were removed under direct visualization. Once the pneumoperitoneum was completely reduced, the final trocar was removed. Gelport removed and fascia closed with running 0-vicryl. The fat closed with 2-0 plain and skin closed with 4-0 running vicryl. Skin of all incisions was closed with surgical adhesive. Merida was removed. Rectovaginal exam done due to proximity of avelino-rectal epiploica to the posterior vaginal incision and was unremarkable. All sponge, lap, needle, and instrument counts were correct times two. Merida was removed. Subsequently, the patient was cleaned up, returned to the supine position, awakened, and taken to the PACU in stable condition.      Signed By: Vivi Hannah MD     May 13, 2020

## 2020-05-13 NOTE — PROGRESS NOTES
Patient A/O x 4, pain well controlled denies nausea, patient has ambulated in hallway and room, voiding with no difficulties.  Patient currently resting in bed, instructed to call for any needs

## 2020-05-13 NOTE — DISCHARGE INSTRUCTIONS
PATIENT INSTRUCTIONS:    After general anesthesia or intravenous sedation, for 24 hours or while taking prescription Narcotics:  · Limit your activities  · Do not drive and operate hazardous machinery  · Do not make important personal or business decisions  · Do  not drink alcoholic beverages  · If you have not urinated within 8 hours after discharge, please contact your surgeon on call. Report the following to your surgeon:  · Excessive pain, swelling, redness or odor of or around the surgical area  · Temperature over 101  · Nausea and vomiting lasting longer than 4 hours or if unable to take medications  · Any signs of decreased circulation or nerve impairment to extremity: change in color, persistent  numbness, tingling, coldness or increase pain  · Any questions, call Dr. Katia Mahan office. What to do at Home:  Recommended activity: Activity as tolerated, as instructed per MD.  No heavy lifting > 5 lbs x 6 weeks. No sexual intercourse, tampons, or douching x 6 weeks. Okay to shower, no tub baths. Resume pre hospital diet. No driving while taking pain meds. If you experience any of the following symptoms temp>101, pain unrelieved by meds, or persistent nausea or vomiting, please follow up with Dr. Gisele Apley. *  Please give a list of your current medications to your Primary Care Provider. *  Please update this list whenever your medications are discontinued, doses are      changed, or new medications (including over-the-counter products) are added. *  Please carry medication information at all times in case of emergency situations. Laparoscopically Assisted Vaginal Hysterectomy: What to Expect at [address] can expect to feel better and stronger each day, although you may need pain medicine for a week or two. You may get tired easily or have less energy than usual. This may last for several weeks after surgery.  You will probably notice that your belly is swollen and puffy. This is common. The swelling will take several weeks to go down. It may take about 4 to 6 weeks to fully recover. The recovery time may be less for some patients. You may have some light vaginal bleeding. Don't have sex until the doctor says it is okay. Don't douche or put anything into your vagina, such as a tampon, until your doctor says it is okay. It is important to avoid lifting while you are recovering so that you can heal.  This care sheet gives you a general idea about how long it will take for you to recover. But each person recovers at a different pace. Follow the steps below to get better as quickly as possible. How can you care for yourself at home? Activity  · Rest when you feel tired. Getting enough sleep will help you recover. · Try to walk each day. Start by walking a little more than you did the day before. Bit by bit, increase the amount you walk. Walking boosts blood flow and helps prevent pneumonia and constipation. · Avoid lifting anything that would make you strain. This may include heavy grocery bags and milk containers, a heavy briefcase or backpack, cat litter or dog food bags, a vacuum , or a child. · Avoid strenuous activities, such as biking, jogging, weight lifting, or aerobic exercise, until your doctor says it is okay. · You may shower. Pat the cut (incision) dry. Do not take a bath for the first 2 weeks, or until your doctor tells you it is okay. · Ask your doctor when you can drive again. · You will probably need to take about 2 weeks off from work. It depends on the type of work you do and how you feel. · Your doctor will tell you when you can have sex again. Diet  · You can eat your normal diet. If your stomach is upset, try bland, low-fat foods like plain rice, broiled chicken, toast, and yogurt. · Drink plenty of fluids (unless your doctor tells you not to). · You may notice that your bowel movements are not regular right after your surgery.  This is common. Try to avoid constipation and straining with bowel movements. You may want to take a fiber supplement every day. If you have not had a bowel movement after a couple of days, ask your doctor about taking a mild laxative. Medicines  · Be safe with medicines. Take pain medicines exactly as directed. ¨ If the doctor gave you a prescription medicine for pain, take it as prescribed. ¨ If you are not taking a prescription pain medicine, ask your doctor if you can take an over-the-counter medicine. · If you think your pain medicine is making you sick to your stomach:  ¨ Take your medicine after meals (unless your doctor has told you not to). ¨ Ask your doctor for a different pain medicine. · If your doctor prescribed antibiotics, take them as directed. Do not stop taking them just because you feel better. You need to take the full course of antibiotics. Incision care  · You may have stitches over the cuts (incisions) the doctor made in your belly. If you have strips of tape on the incisions the doctor made, leave the tape on for a week or until it falls off. Or follow your doctor's instructions for removing the tape. · Wash the area daily with warm, soapy water, and pat it dry. Don't use hydrogen peroxide or alcohol, which can slow healing. You may cover the area with a gauze bandage if it weeps or rubs against clothing. Change the bandage every day. · Keep the area clean and dry. Other instructions  · You may have some light vaginal bleeding. Wear sanitary pads if needed. Do not douche or use tampons. Follow-up care is a key part of your treatment and safety. Be sure to make and go to all appointments, and call your doctor if you are having problems. It's also a good idea to know your test results and keep a list of the medicines you take. When should you call for help? Call 911 anytime you think you may need emergency care. For example, call if:  · You passed out (lost consciousness).   · You have severe trouble breathing. · You have sudden chest pain and shortness of breath, or you cough up blood. Call your doctor now or seek immediate medical care if:  · You have bright red vaginal bleeding that soaks one or more pads in an hour, or you have large clots. · You have foul-smelling discharge from your vagina. · You are sick to your stomach or cannot keep fluids down. · You have pain that does not get better after you take pain medicine. · You have loose stitches, or your incision comes open. · You have signs of infection, such as:  ¨ Increased pain, swelling, warmth, or redness. ¨ Red streaks leading from the incision. ¨ Pus draining from the incision. ¨ A fever. · You have signs of a blood clot, such as:  ¨ Pain in your calf, back of the knee, thigh, or groin. ¨ Redness and swelling in your leg or groin. · You have trouble passing urine or stool, especially if you have pain or swelling in your lower belly. Watch closely for changes in your health, and be sure to contact your doctor if:  · You do not have a bowel movement after taking a laxative. · You have hot flashes, sweating, flushing, or a fast heartbeat, but no fever. Where can you learn more? Go to [facility]  Enter J135 in the search box to learn more about \"Laparoscopically Assisted Vaginal Hysterectomy: What to Expect at Home. \"   © 9015-2601 [employer]. Care instructions adapted under license by [employer] (which disclaims liability or warranty for this information). This care instruction is for use with your licensed healthcare professional. If you have questions about a medical condition or this instruction, always ask your healthcare professional. [place] [address] any warranty or liability for your use of this information.   Content Version: 37.9.246603; Current as of: March 12, 2014                These are general instructions for a healthy lifestyle:    No smoking/ No tobacco products/ Avoid exposure to second hand smoke    Surgeon General's Warning:  Quitting smoking now greatly reduces serious risk to your health. Obesity, smoking, and sedentary lifestyle greatly increases your risk for illness    A healthy diet, regular physical exercise & weight monitoring are important for maintaining a healthy lifestyle    You may be retaining fluid if you have a history of heart failure or if you experience any of the following symptoms:  Weight gain of 3 pounds or more overnight or 5 pounds in a week, increased swelling in our hands or feet or shortness of breath while lying flat in bed. Please call your doctor as soon as you notice any of these symptoms; do not wait until your next office visit. Recognize signs and symptoms of STROKE:    F-face looks uneven    A-arms unable to move or move unevenly    S-speech slurred or non-existent    T-time-call 911 as soon as signs and symptoms begin-DO NOT go       Back to bed or wait to see if you get better-TIME IS BRAIN. The discharge information has been reviewed with the patient. The patient verbalized understanding.

## 2021-07-12 ENCOUNTER — TRANSCRIBE ORDER (OUTPATIENT)
Dept: SCHEDULING | Age: 51
End: 2021-07-12

## 2021-07-12 DIAGNOSIS — Z12.31 VISIT FOR SCREENING MAMMOGRAM: Primary | ICD-10-CM

## 2021-07-20 ENCOUNTER — HOSPITAL ENCOUNTER (OUTPATIENT)
Dept: MAMMOGRAPHY | Age: 51
Discharge: HOME | End: 2021-07-20
Attending: OBSTETRICS & GYNECOLOGY

## 2021-07-20 DIAGNOSIS — Z12.31 VISIT FOR SCREENING MAMMOGRAM: ICD-10-CM

## 2022-03-19 PROBLEM — D25.1 INTRAMURAL LEIOMYOMA OF UTERUS: Status: ACTIVE | Noted: 2020-05-12

## 2022-03-19 PROBLEM — E66.01 OBESITY, MORBID (HCC): Status: ACTIVE | Noted: 2019-12-19

## 2022-03-19 PROBLEM — Z90.710 S/P HYSTERECTOMY: Status: ACTIVE | Noted: 2020-05-12

## 2022-04-07 ENCOUNTER — HOSPITAL ENCOUNTER (OUTPATIENT)
Dept: WOUND CARE | Age: 52
Discharge: HOME | End: 2022-04-07
Attending: SURGERY
Payer: COMMERCIAL

## 2022-04-07 VITALS
DIASTOLIC BLOOD PRESSURE: 100 MMHG | HEART RATE: 92 BPM | BODY MASS INDEX: 42.65 KG/M2 | SYSTOLIC BLOOD PRESSURE: 145 MMHG | HEIGHT: 65 IN | WEIGHT: 256 LBS

## 2022-04-07 PROBLEM — L97.909 VENOUS ULCER (HCC): Status: ACTIVE | Noted: 2022-04-07

## 2022-04-07 PROBLEM — I83.009 VENOUS ULCER (HCC): Status: ACTIVE | Noted: 2022-04-07

## 2022-04-07 PROCEDURE — 99212 OFFICE O/P EST SF 10 MIN: CPT

## 2022-04-07 PROCEDURE — 99203 OFFICE O/P NEW LOW 30 MIN: CPT | Performed by: SURGERY

## 2022-04-07 RX ORDER — GENTAMICIN SULFATE 1 MG/G
OINTMENT TOPICAL ONCE
Status: CANCELLED | OUTPATIENT
Start: 2022-04-07 | End: 2022-04-07

## 2022-04-07 RX ORDER — SILVER SULFADIAZINE 10 G/1000G
CREAM TOPICAL ONCE
Status: CANCELLED | OUTPATIENT
Start: 2022-04-07 | End: 2022-04-07

## 2022-04-07 RX ORDER — LIDOCAINE HYDROCHLORIDE 20 MG/ML
JELLY TOPICAL ONCE
Status: CANCELLED | OUTPATIENT
Start: 2022-04-07 | End: 2022-04-07

## 2022-04-07 RX ORDER — MUPIROCIN 20 MG/G
OINTMENT TOPICAL ONCE
Status: CANCELLED | OUTPATIENT
Start: 2022-04-07 | End: 2022-04-07

## 2022-04-07 RX ORDER — BACITRACIN ZINC AND POLYMYXIN B SULFATE 500; 1000 [USP'U]/G; [USP'U]/G
OINTMENT TOPICAL ONCE
Status: CANCELLED | OUTPATIENT
Start: 2022-04-07 | End: 2022-04-07

## 2022-04-07 RX ORDER — BETAMETHASONE DIPROPIONATE 0.5 MG/G
OINTMENT TOPICAL ONCE
Status: CANCELLED | OUTPATIENT
Start: 2022-04-07 | End: 2022-04-07

## 2022-04-07 RX ORDER — CLOBETASOL PROPIONATE 0.5 MG/G
OINTMENT TOPICAL ONCE
Status: CANCELLED | OUTPATIENT
Start: 2022-04-07 | End: 2022-04-07

## 2022-04-07 RX ORDER — LIDOCAINE 50 MG/G
OINTMENT TOPICAL ONCE
Status: CANCELLED | OUTPATIENT
Start: 2022-04-07 | End: 2022-04-07

## 2022-04-07 RX ORDER — LIDOCAINE 40 MG/G
CREAM TOPICAL ONCE
Status: CANCELLED | OUTPATIENT
Start: 2022-04-07 | End: 2022-04-07

## 2022-04-07 RX ORDER — BACITRACIN 500 [USP'U]/G
OINTMENT TOPICAL ONCE
Status: CANCELLED | OUTPATIENT
Start: 2022-04-07 | End: 2022-04-07

## 2022-04-07 RX ORDER — TRIAMCINOLONE ACETONIDE 1 MG/G
OINTMENT TOPICAL ONCE
Status: CANCELLED | OUTPATIENT
Start: 2022-04-07 | End: 2022-04-07

## 2022-04-07 RX ORDER — LIDOCAINE HYDROCHLORIDE 20 MG/ML
5 SOLUTION OROPHARYNGEAL ONCE
Status: CANCELLED | OUTPATIENT
Start: 2022-04-07 | End: 2022-04-07

## 2022-04-07 RX ORDER — LIDOCAINE HYDROCHLORIDE 40 MG/ML
SOLUTION TOPICAL ONCE
Status: CANCELLED | OUTPATIENT
Start: 2022-04-07 | End: 2022-04-07

## 2022-04-07 NOTE — DISCHARGE INSTRUCTIONS
[address]  [address], [address]   Phone: [phone]  Fax: [phone]    Patient: Bubba Fuentes MRN: [id number]  SSN: [id number]    YOB: 1970  Age: 50 y.o. Sex: female       Return Appointment: 2 weeks with Dr. Rachel Bird    Instructions: Left lower leg wound:  Cleanse with normal saline  Hydrofera Ready: Cut to wound size, place in wound bed, shiny side out. Cover with latex free bandaid  Change 3 times per week    Do not get left leg dressing wet in shower  May purchase a cast cover at [place] or [facility] for showering    Wear tubigrip sock to left leg and compression sock to Right lower leg  Put on in the morning and may remove at night    Elevate legs as much as possible  Avoid standing for prolonged periods of time as much as possible    Should you experience increased redness, swelling, pain, foul odor, size of wound(s), or have a temperature over 101 degrees please contact the [address] [place] [address] Road at [phone] or if after hours contact your primary care physician or go to the hospital emergency department.     Signed By: Ana Baldwin RN     April 7, 2022

## 2022-04-07 NOTE — PROGRESS NOTES
[address], [address], [address] [place] [address] Rd  [phone]    Progress Notes   Mallie Kocher       MRN: [id number]     : 1970     Age: 50 y.o. Subjective/HPI:   This patient is a 50 y.o. female seen and evaluated at the wound clinic for initial evaluation of venous ulcer of the left lower extremity. Patient first developed an ulceration about 1 month ago. Initially she had fair amount of drainage and was treated with a course of antibiotics and since then the drainage has resolved. She reports some tenderness around the ulcer but has not had any surrounding erythema nor has she had any fever. Patient is able to ambulate without difficulty. She does have a history of having a similar ulcer in the same location in the past that resolved with local wound care. Review of Systems  A comprehensive review of systems was negative except for that written in the HPI.     Past Medical History:   Diagnosis Date    Abnormal Papanicolaou smear of cervix     Cough 2016    Gilbert's syndrome     elevated bilirubin levels    Hard of hearing     Headache     HPV (human papilloma virus) infection     Hypersomnia     Hypertension     Morbid obesity ([address]     Narcolepsy without cataplexy(347.00) 2016    not taking medication    Nausea & vomiting     Otitis media with effusion 2016      Past Surgical History:   Procedure Laterality Date    HX CHOLECYSTECTOMY      HX COLPOSCOPY      HX HYSTERECTOMY      HX LEEP PROCEDURE      HX OTHER SURGICAL      cyst on tailbone    HX TOTAL LAPAROSCOPIC HYSTERECTOMY  2020    and tubes only      Allergies   Allergen Reactions    Latex Rash    Pcn [Penicillins] Other (comments)    Sulfa (Sulfonamide Antibiotics) Itching    Lavender Rash and Itching      Social History     Tobacco Use    Smoking status: Never Smoker    Smokeless tobacco: Never Used   Substance Use Topics    Alcohol use: No      Social History     Social History Narrative    Not on file     Family History   Problem Relation Age of Onset    Diabetes Father     Thyroid Disease Father     Heart Disease Father     Stroke Father     Cancer Maternal Grandmother         cancer of the [place] Other Mother         Gilbert's syndrome    Peripheral Vascular Disease Mother     Thyroid Disease Brother     Diabetes Paternal Grandfather     Breast Cancer Other         MOTHERS COUSIN    Breast Cancer Other         MOTHER'S AUNT    Colon Cancer Neg Hx     Ovarian Cancer Neg Hx       Cannot display prior to admission medications because the patient has not been admitted in this contact. Current Outpatient Medications   Medication Sig    cyanocobalamin (Vitamin B-12) 500 mcg tablet Take 500 mcg by mouth daily.  elderberry fruit (ELDERBERRY PO) Take 1 Tab by mouth daily.  ergocalciferol (VITAMIN D2) 50,000 unit capsule Take 50,000 Units by mouth.  Omega-3-DHA-EPA-Fish Oil 1,000 mg (120 mg-180 mg) cap Take  by mouth. No current facility-administered medications for this encounter. Objective:     Vitals:    04/07/22 0830 04/07/22 0843   BP:  (!) 145/100   Pulse:  92   Weight: 116.1 kg (256 lb)    Height: 5' 4.5\" (1.638 m)        Physical Exam:  Ambulation: Normal  Gen- the patient is well developed and in no acute distress  HEENT- no focal abnormalities of the scalp or face. Neck- no JVD or retractions  Lungs- normal respiratory effort. Cardiovascular:  Lower limb pulses: 1+. Abd- soft and no masses evident. Ext- warm without cyanosis. There is mild left lower leg edema. Skin- no jaundice or rashes. Venous ulcer left lower extremity with dry eschar, stasis dermatitis. Please see the specific measurements and descriptions of the wound(s) below. There is no evidence of periwound induration or warmth. No purulence or odor. Neuro- alert and oriented x 3. No gross imotor deficits are present.   Lower limb sensation is intact. Psychological: Affect normal. Mood normal. Memory intact. Wound Abdomen Lower (Active)   Number of days: 695       Wound Perineum (Active)   Number of days: 695       Wound Vagina (Active)   Number of days: 695       Wound Abdomen Left (Active)   Number of days: 695       Wound Abdomen Right (Active)   Number of days: 695       Wound Umbilicus (Active)   Number of days: 695       Wound Pretibial Distal;Left;Medial (Active)   Wound Image   04/07/22 0845   Wound Etiology Venous 04/07/22 0845   Dressing Status Intact 04/07/22 0845   Cleansed Cleansed with saline 04/07/22 0845   Dressing/Treatment Open to air 04/07/22 0845   Wound Length (cm) 1.5 cm 04/07/22 0845   Wound Width (cm) 0.7 cm 04/07/22 0845   Wound Depth (cm) 0.2 cm 04/07/22 0845   Wound Surface Area (cm^2) 1.05 cm^2 04/07/22 0845   Wound Volume (cm^3) 0.21 cm^3 04/07/22 0845   Wound Assessment Slough;Pink/red 04/07/22 0845   Drainage Amount Moderate 04/07/22 0845   Drainage Description Serosanguinous 04/07/22 0845   Wound Odor None 04/07/22 0845   Christin-Wound/Incision Assessment Blanchable erythema; Hemosiderin staining (brown yellow) 04/07/22 0845   Wound Thickness Description Full thickness 04/07/22 0845   Number of days: 0        Assessment:   Venous ulcer left lower extremity  Patient Active Problem List   Diagnosis Code    Otitis media with effusion H65.90    Cough R05.9    Narcolepsy without cataplexy(347.00) G47.419    Obesity, morbid ([facility]) E66.01    Intramural leiomyoma of uterus D25.1    S/P hysterectomy Z90.710     Plan:   Plan start dressing changes with Hydrofera Blue ready and Tubigrip. Patient advised to avoid standing or walking for prolonged periods and to keep leg elevated whenever possible. Follow-up in 2 weeks for recheck.     Follow-up Information     Follow up With Specialties Details Why Contact Info    [facility] Wound Care In 2 weeks For wound re-check John Knutson [address] [place] [address] [place] [address]  [phone]            Thank you very much for the opportunity to participate in this patient's care. Signed By: Woodard Nageotte, MD     April 7, 2022        TIME:  If total time for today's E/M service is used for level of service it is documented below. This time includes physician non-face-to-face service time visit on the date of service and includes    Preparing to see the patient (eg, review of tests)  Obtaining and/or reviewing separately obtained history  Performing a medically necessary appropriate examination and/or evaluation  Counseling and educating the patient/family/caregiver  Ordering medications, tests, or procedures  Referring and communicating with other health care professionals as needed  Documenting clinical information in the electronic or other health record  Independently interpreting results (not reported separately) and communicating results to the patient/family/caregiver  Care coordination (not reported separately)    E/M time =      20    minutes.

## 2022-04-07 NOTE — WOUND CARE
[address]  [address], [address]   Phone: [phone]  Fax: [phone]    Patient: Freeman Frey MRN: [id number]  SSN: [id number]    YOB: 1970  Age: 50 y.o. Sex: female       Return Appointment: 2 weeks with Dr. Nita Lyons    Instructions: Left lower leg wound:  Cleanse with normal saline  Hydrofera Ready: Cut to wound size, place in wound bed, shiny side out. Cover with latex free bandaid  Change 3 times per week    Do not get left leg dressing wet in shower  May purchase a cast cover at [place] or [facility] for showering    Wear tubigrip sock to left leg and compression sock to Right lower leg  Put on in the morning and may remove at night    Elevate legs as much as possible  Avoid standing for prolonged periods of time as much as possible    Should you experience increased redness, swelling, pain, foul odor, size of wound(s), or have a temperature over 101 degrees please contact the [address] [place] [address] Road at [phone] or if after hours contact your primary care physician or go to the hospital emergency department.     Signed By: Quinton David RN     April 7, 2022

## 2022-04-07 NOTE — WOUND CARE
04/07/22 0845   Wound Pretibial Distal;Left;Medial   Date First Assessed/Time First Assessed: 04/07/22 0845   Present on Hospital Admission: Yes  Wound Approximate Age at First Assessment (Weeks): 4 weeks  Primary Wound Type: Traumatic  Location: Pretibial  Wound Location Orientation: Distal;Left;Medial   Wound Image    Wound Etiology Venous   Dressing Status Intact   Cleansed Cleansed with saline   Dressing/Treatment Open to air   Wound Length (cm) 1.5 cm   Wound Width (cm) 0.7 cm   Wound Depth (cm) 0.2 cm   Wound Surface Area (cm^2) 1.05 cm^2   Wound Volume (cm^3) 0.21 cm^3   Wound Assessment Slough;Pink/red   Drainage Amount Moderate   Drainage Description Serosanguinous   Wound Odor None   Christin-Wound/Incision Assessment Blanchable erythema; Hemosiderin staining (brown yellow)   Wound Thickness Description Full thickness     Wound mechanically debrided with saline and gauze

## 2022-04-07 NOTE — WOUND CARE
04/07/22 0845   Wound Pretibial Distal;Left;Medial   Date First Assessed/Time First Assessed: 04/07/22 0845   Present on Hospital Admission: Yes  Wound Approximate Age at First Assessment (Weeks): 4 weeks  Primary Wound Type: Traumatic  Location: Pretibial  Wound Location Orientation: Distal;Left;Medial   Wound Image    Wound Etiology Traumatic   Dressing Status Intact   Cleansed Cleansed with saline   Dressing/Treatment Open to air   Wound Length (cm) 1.5 cm   Wound Width (cm) 0.7 cm   Wound Depth (cm) 0.2 cm   Wound Surface Area (cm^2) 1.05 cm^2   Wound Volume (cm^3) 0.21 cm^3   Wound Assessment Slough;Pink/red   Drainage Amount Small   Drainage Description Serosanguinous   Wound Odor None   Christin-Wound/Incision Assessment Blanchable erythema; Hemosiderin staining (brown yellow)   Wound Thickness Description Full thickness     Wound mechanically debrided with saline and gauze

## 2022-04-11 PROBLEM — L97.909 VENOUS ULCER (HCC): Status: ACTIVE | Noted: 2022-04-07

## 2022-04-11 PROBLEM — I83.009 VENOUS ULCER (HCC): Status: ACTIVE | Noted: 2022-04-07

## 2022-04-20 ENCOUNTER — HOSPITAL ENCOUNTER (OUTPATIENT)
Dept: WOUND CARE | Age: 52
Discharge: HOME | End: 2022-04-20
Attending: SURGERY
Payer: COMMERCIAL

## 2022-04-20 VITALS
RESPIRATION RATE: 18 BRPM | HEIGHT: 65 IN | WEIGHT: 257 LBS | OXYGEN SATURATION: 99 % | BODY MASS INDEX: 42.82 KG/M2 | DIASTOLIC BLOOD PRESSURE: 76 MMHG | HEART RATE: 81 BPM | TEMPERATURE: 98.5 F | SYSTOLIC BLOOD PRESSURE: 115 MMHG

## 2022-04-20 DIAGNOSIS — I83.009 VENOUS ULCER (HCC): Primary | ICD-10-CM

## 2022-04-20 DIAGNOSIS — L97.909 VENOUS ULCER (HCC): Primary | ICD-10-CM

## 2022-04-20 PROCEDURE — 99213 OFFICE O/P EST LOW 20 MIN: CPT | Performed by: SURGERY

## 2022-04-20 PROCEDURE — 99212 OFFICE O/P EST SF 10 MIN: CPT

## 2022-04-20 RX ORDER — LIDOCAINE 50 MG/G
OINTMENT TOPICAL ONCE
Status: CANCELLED | OUTPATIENT
Start: 2022-04-20 | End: 2022-04-20

## 2022-04-20 RX ORDER — SILVER SULFADIAZINE 10 G/1000G
CREAM TOPICAL ONCE
Status: CANCELLED | OUTPATIENT
Start: 2022-04-20 | End: 2022-04-20

## 2022-04-20 RX ORDER — BETAMETHASONE DIPROPIONATE 0.5 MG/G
OINTMENT TOPICAL ONCE
Status: CANCELLED | OUTPATIENT
Start: 2022-04-20 | End: 2022-04-20

## 2022-04-20 RX ORDER — GENTAMICIN SULFATE 1 MG/G
OINTMENT TOPICAL ONCE
Status: CANCELLED | OUTPATIENT
Start: 2022-04-20 | End: 2022-04-20

## 2022-04-20 RX ORDER — LIDOCAINE HYDROCHLORIDE 20 MG/ML
5 SOLUTION OROPHARYNGEAL ONCE
Status: CANCELLED | OUTPATIENT
Start: 2022-04-20 | End: 2022-04-20

## 2022-04-20 RX ORDER — LIDOCAINE 40 MG/G
CREAM TOPICAL ONCE
Status: CANCELLED | OUTPATIENT
Start: 2022-04-20 | End: 2022-04-20

## 2022-04-20 RX ORDER — MUPIROCIN 20 MG/G
OINTMENT TOPICAL ONCE
Status: CANCELLED | OUTPATIENT
Start: 2022-04-20 | End: 2022-04-20

## 2022-04-20 RX ORDER — LIDOCAINE HYDROCHLORIDE 20 MG/ML
JELLY TOPICAL ONCE
Status: CANCELLED | OUTPATIENT
Start: 2022-04-20 | End: 2022-04-20

## 2022-04-20 RX ORDER — LIDOCAINE HYDROCHLORIDE 40 MG/ML
SOLUTION TOPICAL ONCE
Status: CANCELLED | OUTPATIENT
Start: 2022-04-20 | End: 2022-04-20

## 2022-04-20 RX ORDER — BACITRACIN ZINC AND POLYMYXIN B SULFATE 500; 1000 [USP'U]/G; [USP'U]/G
OINTMENT TOPICAL ONCE
Status: CANCELLED | OUTPATIENT
Start: 2022-04-20 | End: 2022-04-20

## 2022-04-20 RX ORDER — TRIAMCINOLONE ACETONIDE 1 MG/G
OINTMENT TOPICAL ONCE
Status: CANCELLED | OUTPATIENT
Start: 2022-04-20 | End: 2022-04-20

## 2022-04-20 RX ORDER — CLOBETASOL PROPIONATE 0.5 MG/G
OINTMENT TOPICAL ONCE
Status: CANCELLED | OUTPATIENT
Start: 2022-04-20 | End: 2022-04-20

## 2022-04-20 RX ORDER — BACITRACIN 500 [USP'U]/G
OINTMENT TOPICAL ONCE
Status: CANCELLED | OUTPATIENT
Start: 2022-04-20 | End: 2022-04-20

## 2022-04-20 NOTE — WOUND CARE
[address]  [address], [address]   Phone: [phone]  Fax: [phone]    Patient: Didier Ribera MRN: [id number]  SSN: [id number]    YOB: 1970  Age: 50 y.o. Sex: female       Return Appointment: 2 weeks with Dr. Kaia Larson    Instructions: Left lower leg wound:  Cleanse with normal saline  Hydrofera Ready: Cut to wound size, place in wound bed, shiny side out. Cover with latex free bandaid  Change 3 times per week     Do not get left leg dressing wet in shower  May purchase a cast cover at [place] or [facility] for showering     Wear tubigrip sock to left leg and compression sock to Right lower leg  Put on in the morning and may remove at night     Elevate legs as much as possible  Avoid standing for prolonged periods of time as much as possible    Should you experience increased redness, swelling, pain, foul odor, size of wound(s), or have a temperature over 101 degrees please contact the [address] [place] [address] Road at [phone] or if after hours contact your primary care physician or go to the hospital emergency department.     Signed By: Shamir Whitt RN     April 20, 2022

## 2022-04-20 NOTE — PROGRESS NOTES
[address], [address], [address] [place] [address] Rd  [phone]    Progress Notes   Fredy Roper       MRN: [id number]     : 1970     Age: 50 y.o. Subjective/HPI:   This patient is a 50 y.o. female seen and evaluated at the wound clinic for follow-up of venous ulcer of the left lower extremity. Patient has been receiving dressing changes with Hydrofera Blue and Tubigrip. On today's visit she reports that the drainage has decreased significantly. She only has mild discomfort associated with the ulcer. Patient has not noted any redness or purulence and is able to ambulate without difficulty. No significant changes in overall health since her last visit. Review of Systems  A comprehensive review of systems was negative except for that written in the HPI.     Past Medical History:   Diagnosis Date    Abnormal Papanicolaou smear of cervix     Cough 2016    Gilbert's syndrome     elevated bilirubin levels    Hard of hearing     Headache     HPV (human papilloma virus) infection     Hypersomnia     Hypertension     Morbid obesity ([address]     Narcolepsy without cataplexy(347.00) 2016    not taking medication    Nausea & vomiting     Otitis media with effusion 2016      Past Surgical History:   Procedure Laterality Date    HX CHOLECYSTECTOMY      HX COLPOSCOPY      HX HYSTERECTOMY      HX LEEP PROCEDURE      HX OTHER SURGICAL      cyst on tailbone    HX TOTAL LAPAROSCOPIC HYSTERECTOMY  2020    and tubes only      Allergies   Allergen Reactions    Latex Rash    Pcn [Penicillins] Other (comments)    Sulfa (Sulfonamide Antibiotics) Itching    Lavender Rash and Itching      Social History     Tobacco Use    Smoking status: Never Smoker    Smokeless tobacco: Never Used   Substance Use Topics    Alcohol use: No      Social History     Social History Narrative    Not on file     Family History   Problem Relation Age of Onset    Diabetes Father     Thyroid Disease Father     Heart Disease Father     Stroke Father     Cancer Maternal Grandmother         cancer of the [place] Other Mother         Gilbert's syndrome    Peripheral Vascular Disease Mother     Thyroid Disease Brother     Diabetes Paternal Grandfather     Breast Cancer Other         MOTHERS COUSIN    Breast Cancer Other         MOTHER'S AUNT    Colon Cancer Neg Hx     Ovarian Cancer Neg Hx       Cannot display prior to admission medications because the patient has not been admitted in this contact. Current Outpatient Medications   Medication Sig    cyanocobalamin (Vitamin B-12) 500 mcg tablet Take 500 mcg by mouth daily.  elderberry fruit (ELDERBERRY PO) Take 1 Tab by mouth daily.  ergocalciferol (VITAMIN D2) 50,000 unit capsule Take 50,000 Units by mouth.  Omega-3-DHA-EPA-Fish Oil 1,000 mg (120 mg-180 mg) cap Take  by mouth. No current facility-administered medications for this encounter. Objective:     Vitals:    04/20/22 1325   BP: 115/76   Pulse: 81   Resp: 18   Temp: 98.5 °F (36.9 °C)   SpO2: 99%   Weight: 116.6 kg (257 lb)   Height: 5' 4.5\" (1.638 m)       Physical Exam:  Ambulation: Normal  Gen- the patient is well developed and in no acute distress  HEENT- no focal abnormalities of the scalp or face. Neck- no JVD or retractions  Lungs- normal respiratory effort. Cardiovascular:  Lower limb pulses: 1+. Abd- soft and no masses evident. Ext- warm without cyanosis. There is mild lower leg edema that appears well controlled. Skin- no jaundice or rashes. Venous ulcer left lower extremity much smaller, good granulation tissue, stasis dermatitis around ankle. Please see the specific measurements and descriptions of the wound(s) below. There is no evidence of periwound induration or warmth. No purulence or odor. Neuro- alert and oriented x 3. No gross imotor deficits are present.   Lower limb sensation is intact. Psychological: Affect normal. Mood normal. Memory intact. Wound Abdomen Lower (Active)   Number of days: 708       Wound Perineum (Active)   Number of days: 708       Wound Vagina (Active)   Number of days: 708       Wound Abdomen Left (Active)   Number of days: 708       Wound Abdomen Right (Active)   Number of days: 708       Wound Umbilicus (Active)   Number of days: 708       Wound Pretibial Distal;Left;Medial (Active)   Wound Image   04/20/22 1359   Wound Etiology Venous 04/20/22 1359   Dressing Status Intact 04/20/22 1359   Cleansed Cleansed with saline; Soap and water 04/20/22 1359   Dressing/Treatment Hydrofera Blue 04/20/22 1359   Wound Length (cm) 1.4 cm 04/20/22 1359   Wound Width (cm) 0.5 cm 04/20/22 1359   Wound Depth (cm) 0.2 cm 04/20/22 1359   Wound Surface Area (cm^2) 0.7 cm^2 04/20/22 1359   Change in Wound Size % 33.33 04/20/22 1359   Wound Volume (cm^3) 0.14 cm^3 04/20/22 1359   Wound Healing % 33 04/20/22 1359   Wound Assessment Granulation tissue;Pink/red 04/20/22 1359   Drainage Amount Small 04/20/22 1359   Drainage Description Serosanguinous 04/07/22 0845   Wound Odor None 04/20/22 1359   Christin-Wound/Incision Assessment Hemosiderin staining (brown yellow); Dry/flaky 04/20/22 1359   Wound Thickness Description Full thickness 04/20/22 1359   Number of days: 13        Assessment:   Venous ulcer left lower extremity, improved  Patient Active Problem List   Diagnosis Code    Otitis media with effusion H65.90    Cough R05.9    Narcolepsy without cataplexy(347.00) G47.419    Obesity, morbid ([address] E66.01    Intramural leiomyoma of uterus D25.1    S/P hysterectomy Z90.710    Venous ulcer ([address] I83.009, L97.909     Plan:   Plan to continue dressing changes with Hydrofera Blue ready and Tubigrip. Patient advised to keep leg elevated is much as possible. Follow-up in 2 weeks for recheck.     Follow-up Information    None           Thank you very much for the opportunity to participate in this patient's care. Signed By: Zane Lopez MD     April 20, 2022        TIME:  If total time for today's E/M service is used for level of service it is documented below. This time includes physician non-face-to-face service time visit on the date of service and includes    Preparing to see the patient (eg, review of tests)  Obtaining and/or reviewing separately obtained history  Performing a medically necessary appropriate examination and/or evaluation  Counseling and educating the patient/family/caregiver  Ordering medications, tests, or procedures  Referring and communicating with other health care professionals as needed  Documenting clinical information in the electronic or other health record  Independently interpreting results (not reported separately) and communicating results to the patient/family/caregiver  Care coordination (not reported separately)    E/M time =      20    minutes.

## 2022-04-20 NOTE — DISCHARGE INSTRUCTIONS
[address]  [address], [address]  Phone: [phone]  Fax: [phone]    Patient: Bubba Fuentes MRN: [id number]  SSN: [id number]    YOB: 1970  Age: 50 y.o. Sex: female       Return Appointment: 2 weeks with Dr. Rachel Bird    Instructions: Left lower leg wound:  Cleanse with normal saline  Hydrofera Ready: Cut to wound size, place in wound bed, shiny side out. Cover with latex free bandaid  Change 3 times per week     Do not get left leg dressing wet in shower  May purchase a cast cover at [place] or [facility] for showering     Wear tubigrip sock to left leg and compression sock to Right lower leg  Put on in the morning and may remove at night     Elevate legs as much as possible  Avoid standing for prolonged periods of time as much as possible    Should you experience increased redness, swelling, pain, foul odor, size of wound(s), or have a temperature over 101 degrees please contact the [address] [place] [address] Road at [phone] or if after hours contact your primary care physician or go to the hospital emergency department.     Signed By: Ana Baldwin RN     April 20, 2022

## 2022-04-20 NOTE — WOUND CARE
04/20/22 1359   Wound Pretibial Distal;Left;Medial   Date First Assessed/Time First Assessed: 04/07/22 0845   Present on Hospital Admission: Yes  Wound Approximate Age at First Assessment (Weeks): 4 weeks  Primary Wound Type: Traumatic  Location: Pretibial  Wound Location Orientation: Distal;Left;Medial   Wound Image    Wound Etiology Venous   Dressing Status Intact   Cleansed Cleansed with saline; Soap and water   Dressing/Treatment Hydrofera Blue  ((Ready))   Wound Length (cm) 1.4 cm   Wound Width (cm) 0.5 cm   Wound Depth (cm) 0.2 cm   Wound Surface Area (cm^2) 0.7 cm^2   Change in Wound Size % 33.33   Wound Volume (cm^3) 0.14 cm^3   Wound Healing % 33   Wound Assessment Granulation tissue;Pink/red   Drainage Amount Small   Wound Odor None   Christin-Wound/Incision Assessment Hemosiderin staining (brown yellow); Dry/flaky   Wound Thickness Description Full thickness   Mechanically debrided with gauze and saline

## 2022-05-04 ENCOUNTER — HOSPITAL ENCOUNTER (OUTPATIENT)
Dept: WOUND CARE | Age: 52
Discharge: HOME | End: 2022-05-04
Attending: SURGERY
Payer: COMMERCIAL

## 2022-05-04 VITALS
DIASTOLIC BLOOD PRESSURE: 69 MMHG | HEART RATE: 81 BPM | WEIGHT: 255 LBS | RESPIRATION RATE: 18 BRPM | HEIGHT: 65 IN | SYSTOLIC BLOOD PRESSURE: 132 MMHG | TEMPERATURE: 99 F | BODY MASS INDEX: 42.49 KG/M2

## 2022-05-04 PROCEDURE — 99213 OFFICE O/P EST LOW 20 MIN: CPT | Performed by: SURGERY

## 2022-05-04 PROCEDURE — 99213 OFFICE O/P EST LOW 20 MIN: CPT

## 2022-05-04 NOTE — PROGRESS NOTES
[address], [address], [address] [place] [address] Rd  [phone]    Progress Notes   Aleida Bhakta       MRN: [id number]     : 1970     Age: 50 y.o. Subjective/HPI:   This patient is a 50 y.o. female seen and evaluated at the wound clinic for follow-up of venous ulcer of the left lower extremity. Patient has been receiving dressing changes with Hydrofera Blue and Tubigrip. On today's visit she reports some excoriation of the skin around the ulcer. She has not had any erythema or purulent drainage nor is she having any significant pain or discomfort. She continues to ambulate without difficulty and reports no changes in overall health since previous visit. Review of Systems  A comprehensive review of systems was negative except for that written in the HPI.     Past Medical History:   Diagnosis Date    Abnormal Papanicolaou smear of cervix     Cough 2016    Gilbert's syndrome     elevated bilirubin levels    Hard of hearing     Headache     HPV (human papilloma virus) infection     Hypersomnia     Hypertension     Morbid obesity ([address]     Narcolepsy without cataplexy(347.00) 2016    not taking medication    Nausea & vomiting     Otitis media with effusion 2016      Past Surgical History:   Procedure Laterality Date    HX CHOLECYSTECTOMY      HX COLPOSCOPY      HX HYSTERECTOMY      HX LEEP PROCEDURE      HX OTHER SURGICAL      cyst on tailbone    HX TOTAL LAPAROSCOPIC HYSTERECTOMY  2020    and tubes only      Allergies   Allergen Reactions    Latex Rash    Pcn [Penicillins] Other (comments)    Sulfa (Sulfonamide Antibiotics) Itching    Lavender Rash and Itching      Social History     Tobacco Use    Smoking status: Never Smoker    Smokeless tobacco: Never Used   Substance Use Topics    Alcohol use: No      Social History     Social History Narrative    Not on file     Family History   Problem Relation Age of Onset    Diabetes Father     Thyroid Disease Father     Heart Disease Father     Stroke Father     Cancer Maternal Grandmother         cancer of the [place] Other Mother         Gilbert's syndrome    Peripheral Vascular Disease Mother     Thyroid Disease Brother     Diabetes Paternal Grandfather     Breast Cancer Other         MOTHERS COUSIN    Breast Cancer Other         MOTHER'S AUNT    Colon Cancer Neg Hx     Ovarian Cancer Neg Hx       Cannot display prior to admission medications because the patient has not been admitted in this contact. Current Outpatient Medications   Medication Sig    sod sulf-pot chloride-mag sulf (Sutab) 1.479-0.188- 0.225 gram tab Take 1 Box by mouth two (2) times a day. Indications: FOLLOW THE INSTRUCTIONS GIVEN BY THE OFFICE ONLY.  cyanocobalamin (Vitamin B-12) 500 mcg tablet Take 500 mcg by mouth daily.  elderberry fruit (ELDERBERRY PO) Take 1 Tab by mouth daily.  ergocalciferol (VITAMIN D2) 50,000 unit capsule Take 50,000 Units by mouth.  Omega-3-DHA-EPA-Fish Oil 1,000 mg (120 mg-180 mg) cap Take  by mouth. No current facility-administered medications for this encounter. Objective:     Vitals:    05/04/22 1530 05/04/22 1531   BP:  132/69   Pulse:  81   Resp:  18   Temp:  99 °F (37.2 °C)   Weight: 115.7 kg (255 lb)    Height: 5' 4.5\" (1.638 m)        Physical Exam:  Ambulation: Normal  Gen- the patient is well developed and in no acute distress  HEENT- no focal abnormalities of the scalp or face. Neck- no JVD or retractions  Lungs- normal respiratory effort. Cardiovascular:  Lower limb pulses: 1+. Abd- soft and no masses evident. Ext- warm without cyanosis. There is mild lower leg edema that appears well controlled. Skin- no jaundice or rashes. Venous ulcer left ankle almost completely healed however there is retraction of the scar and some excoriation of the surrounding skin.   Please see the specific measurements and descriptions of the wound(s) below. There is no evidence of periwound induration or warmth. No purulence or odor. Neuro- alert and oriented x 3. No gross imotor deficits are present. Lower limb sensation is intact. Psychological: Affect normal. Mood normal. Memory intact. Wound Abdomen Lower (Active)   Number of days: 722       Wound Perineum (Active)   Number of days: 722       Wound Vagina (Active)   Number of days: 722       Wound Abdomen Left (Active)   Number of days: 722       Wound Abdomen Right (Active)   Number of days: 722       Wound Umbilicus (Active)   Number of days: 722       Wound Pretibial Distal;Left;Medial (Active)   Wound Image   05/04/22 1532   Wound Etiology Venous 05/04/22 1532   Dressing Status Intact 05/04/22 1532   Cleansed Cleansed with saline 05/04/22 1532   Dressing/Treatment Hydrofera Blue 05/04/22 1532   Wound Length (cm) 1 cm 05/04/22 1532   Wound Width (cm) 0.4 cm 05/04/22 1532   Wound Depth (cm) 0.1 cm 05/04/22 1532   Wound Surface Area (cm^2) 0.4 cm^2 05/04/22 1532   Change in Wound Size % 61.9 05/04/22 1532   Wound Volume (cm^3) 0.04 cm^3 05/04/22 1532   Wound Healing % 81 05/04/22 1532   Wound Assessment Epithelialization 05/04/22 1532   Drainage Amount Small 05/04/22 1532   Drainage Description Serosanguinous 05/04/22 1532   Wound Odor None 05/04/22 1532   Christin-Wound/Incision Assessment Hemosiderin staining (brown yellow) 05/04/22 1532   Wound Thickness Description Full thickness 05/04/22 1532   Number of days: 27        Assessment:   Venous ulcer left lower extremity  Patient Active Problem List   Diagnosis Code    Otitis media with effusion H65.90    Cough R05.9    Narcolepsy without cataplexy(347.00) G47.419    Obesity, morbid ([address] E66.01    Intramural leiomyoma of uterus D25.1    S/P hysterectomy Z90.710    Venous ulcer ([facility] I83.009, L97.909     Plan: Will switch to dressing changes with Xeroform gauze and Tubigrip, apply Desitin to excoriated skin.   Follow-up in 2 weeks if needed. Follow-up Information    None           Thank you very much for the opportunity to participate in this patient's care. Signed By: Baldomero Mejía MD     May 4, 2022        TIME:  If total time for today's E/M service is used for level of service it is documented below. This time includes physician non-face-to-face service time visit on the date of service and includes    Preparing to see the patient (eg, review of tests)  Obtaining and/or reviewing separately obtained history  Performing a medically necessary appropriate examination and/or evaluation  Counseling and educating the patient/family/caregiver  Ordering medications, tests, or procedures  Referring and communicating with other health care professionals as needed  Documenting clinical information in the electronic or other health record  Independently interpreting results (not reported separately) and communicating results to the patient/family/caregiver  Care coordination (not reported separately)    E/M time =    20      minutes.

## 2022-05-04 NOTE — WOUND CARE
05/04/22 1532   Wound Pretibial Distal;Left;Medial   Date First Assessed/Time First Assessed: 04/07/22 0845   Present on Hospital Admission: Yes  Wound Approximate Age at First Assessment (Weeks): 4 weeks  Primary Wound Type: Traumatic  Location: Pretibial  Wound Location Orientation: Distal;Left;Medial   Wound Image    Wound Etiology Venous   Dressing Status Intact   Cleansed Cleansed with saline   Dressing/Treatment Hydrofera Blue   Wound Length (cm) 1 cm   Wound Width (cm) 0.4 cm   Wound Depth (cm) 0.1 cm   Wound Surface Area (cm^2) 0.4 cm^2   Change in Wound Size % 61.9   Wound Volume (cm^3) 0.04 cm^3   Wound Healing % 81   Wound Assessment Epithelialization   Drainage Amount Small   Drainage Description Serosanguinous   Wound Odor None   Christin-Wound/Incision Assessment Hemosiderin staining (brown yellow)   Wound Thickness Description Full thickness

## 2022-05-04 NOTE — WOUND CARE
[address]  [address], [address]  Phone: [phone]  Fax: [phone]    Patient: Zach Zhou MRN: [id number]  SSN: [id number]    YOB: 1970  Age: 50 y.o. Sex: female       Return Appointment: 2 weeks with Dr. Alicia Perkins    Instructions: Left lower leg wound:  Cleanse with normal saline  Apply xeroform to wound. Cover with latex free bandaid  Change 3 times per week     Do not get left leg dressing wet in shower  May purchase a cast cover at [place] or [facility] for showering     Wear tubigrip sock to left leg and compression sock to Right lower leg  Put on in the morning and may remove at night     Elevate legs as much as possible  Avoid standing for prolonged periods of time as much as possible    Should you experience increased redness, swelling, pain, foul odor, size of wound(s), or have a temperature over 101 degrees please contact the [address] [place] [address] Road at [phone] or if after hours contact your primary care physician or go to the hospital emergency department.     Signed By: Justyn Jones RN     May 4, 2022

## 2022-05-18 ENCOUNTER — HOSPITAL ENCOUNTER (OUTPATIENT)
Dept: WOUND CARE | Age: 52
Discharge: HOME | End: 2022-05-18
Attending: SURGERY
Payer: COMMERCIAL

## 2022-05-18 VITALS
RESPIRATION RATE: 18 BRPM | HEIGHT: 65 IN | OXYGEN SATURATION: 97 % | BODY MASS INDEX: 42.49 KG/M2 | HEART RATE: 76 BPM | TEMPERATURE: 98.2 F | SYSTOLIC BLOOD PRESSURE: 133 MMHG | WEIGHT: 255 LBS | DIASTOLIC BLOOD PRESSURE: 74 MMHG

## 2022-05-18 DIAGNOSIS — I83.009 VENOUS ULCER (HCC): Primary | ICD-10-CM

## 2022-05-18 DIAGNOSIS — L97.909 VENOUS ULCER (HCC): Primary | ICD-10-CM

## 2022-05-18 PROCEDURE — 99213 OFFICE O/P EST LOW 20 MIN: CPT

## 2022-05-18 PROCEDURE — 99213 OFFICE O/P EST LOW 20 MIN: CPT | Performed by: SURGERY

## 2022-05-18 RX ORDER — TRIAMCINOLONE ACETONIDE 1 MG/G
OINTMENT TOPICAL ONCE
OUTPATIENT
Start: 2022-05-18 | End: 2022-05-18

## 2022-05-18 RX ORDER — GENTAMICIN SULFATE 1 MG/G
OINTMENT TOPICAL ONCE
OUTPATIENT
Start: 2022-05-18 | End: 2022-05-18

## 2022-05-18 RX ORDER — BACITRACIN ZINC AND POLYMYXIN B SULFATE 500; 1000 [USP'U]/G; [USP'U]/G
OINTMENT TOPICAL ONCE
OUTPATIENT
Start: 2022-05-18 | End: 2022-05-18

## 2022-05-18 RX ORDER — SILVER SULFADIAZINE 10 G/1000G
CREAM TOPICAL ONCE
OUTPATIENT
Start: 2022-05-18 | End: 2022-05-18

## 2022-05-18 RX ORDER — LIDOCAINE HYDROCHLORIDE 20 MG/ML
JELLY TOPICAL ONCE
OUTPATIENT
Start: 2022-05-18 | End: 2022-05-18

## 2022-05-18 RX ORDER — LIDOCAINE HYDROCHLORIDE 40 MG/ML
SOLUTION TOPICAL ONCE
OUTPATIENT
Start: 2022-05-18 | End: 2022-05-18

## 2022-05-18 RX ORDER — LIDOCAINE 40 MG/G
CREAM TOPICAL ONCE
OUTPATIENT
Start: 2022-05-18 | End: 2022-05-18

## 2022-05-18 RX ORDER — BACITRACIN 500 [USP'U]/G
OINTMENT TOPICAL ONCE
OUTPATIENT
Start: 2022-05-18 | End: 2022-05-18

## 2022-05-18 RX ORDER — BETAMETHASONE DIPROPIONATE 0.5 MG/G
OINTMENT TOPICAL ONCE
OUTPATIENT
Start: 2022-05-18 | End: 2022-05-18

## 2022-05-18 RX ORDER — LIDOCAINE HYDROCHLORIDE 20 MG/ML
5 SOLUTION OROPHARYNGEAL ONCE
OUTPATIENT
Start: 2022-05-18 | End: 2022-05-18

## 2022-05-18 RX ORDER — CLOBETASOL PROPIONATE 0.5 MG/G
OINTMENT TOPICAL ONCE
OUTPATIENT
Start: 2022-05-18 | End: 2022-05-18

## 2022-05-18 RX ORDER — LIDOCAINE 50 MG/G
OINTMENT TOPICAL ONCE
OUTPATIENT
Start: 2022-05-18 | End: 2022-05-18

## 2022-05-18 RX ORDER — MUPIROCIN 20 MG/G
OINTMENT TOPICAL ONCE
OUTPATIENT
Start: 2022-05-18 | End: 2022-05-18

## 2022-05-18 NOTE — WOUND CARE
[address]  [address], [address]  Phone: [phone]  Fax: [phone]    Patient: Samina Lin MRN: [id number]  SSN: [id number]    YOB: 1970  Age: 50 y.o. Sex: female       Return Appointment: 2 weeks with Dr. Imani Rooney    Instructions: Left lower leg wound:  Cleanse with normal saline  Apply moisture barrier to wound periphery  Puracol Plus (may substitute equivalent collagen):cut to approximate wound size, apply to wound bed. Cover with latex free bandaid  Change 3 times per week     Do not get left leg dressing wet in shower  May purchase a cast cover at [place] or [facility] for showering     Wear tubigrip sock to left leg and compression sock to Right lower leg  Put on in the morning and may remove at night     Elevate legs as much as possible  Avoid standing for prolonged periods of time as much as possible  Begin taking protein supplement such as José Miguel 2 times daily to promote increase wound healing. Should you experience increased redness, swelling, pain, foul odor, size of wound(s), or have a temperature over 101 degrees please contact the [address] [place] [address] Road at [phone] or if after hours contact your primary care physician or go to the hospital emergency department.     Signed By: Rere Weston PT, [facility]    May 18, 2022

## 2022-05-18 NOTE — PROGRESS NOTES
[address], [address], [address] [place] [address] Rd  [phone]    Progress Notes   Isma Murray       MRN: [id number]     : 1970     Age: 50 y.o. Subjective/HPI:   This patient is a 50 y.o. female seen and evaluated at the wound clinic for follow-up of venous ulcer of the left lower extremity. Patient has been receiving dressing changes with Xeroform gauze and Desitin to the surrounding skin with Tubigrip. On today's visit she is without complaints and denies having any unusual pain or discomfort nor has she noted any redness or purulent drainage. She is having some increased serous drainage. Patient is ambulating without difficulty and reports no significant changes in overall health. Review of Systems  A comprehensive review of systems was negative except for that written in the HPI.     Past Medical History:   Diagnosis Date    Abnormal Papanicolaou smear of cervix     Cough 2016    Gilbert's syndrome     elevated bilirubin levels    Hard of hearing     Headache     HPV (human papilloma virus) infection     Hypersomnia     Hypertension     Morbid obesity ([address]     Narcolepsy without cataplexy(347.00) 2016    not taking medication    Nausea & vomiting     Otitis media with effusion 2016      Past Surgical History:   Procedure Laterality Date    HX CHOLECYSTECTOMY      HX COLPOSCOPY      HX HYSTERECTOMY      HX LEEP PROCEDURE      HX OTHER SURGICAL      cyst on tailbone    HX TOTAL LAPAROSCOPIC HYSTERECTOMY  2020    and tubes only      Allergies   Allergen Reactions    Latex Rash    Pcn [Penicillins] Other (comments)    Sulfa (Sulfonamide Antibiotics) Itching    Lavender Rash and Itching      Social History     Tobacco Use    Smoking status: Never Smoker    Smokeless tobacco: Never Used   Substance Use Topics    Alcohol use: No      Social History     Social History Narrative    Not on file     Family History   Problem Relation Age of Onset    Diabetes Father     Thyroid Disease Father     Heart Disease Father     Stroke Father     Cancer Maternal Grandmother         cancer of the [place] Other Mother         Gilbert's syndrome    Peripheral Vascular Disease Mother     Thyroid Disease Brother     Diabetes Paternal Grandfather     Breast Cancer Other         MOTHERS COUSIN    Breast Cancer Other         MOTHER'S AUNT    Colon Cancer Neg Hx     Ovarian Cancer Neg Hx       Cannot display prior to admission medications because the patient has not been admitted in this contact. Current Outpatient Medications   Medication Sig    sod sulf-pot chloride-mag sulf (Sutab) 1.479-0.188- 0.225 gram tab Take 1 Box by mouth two (2) times a day. Indications: FOLLOW THE INSTRUCTIONS GIVEN BY THE OFFICE ONLY.  cyanocobalamin (Vitamin B-12) 500 mcg tablet Take 500 mcg by mouth daily.  elderberry fruit (ELDERBERRY PO) Take 1 Tab by mouth daily.  ergocalciferol (VITAMIN D2) 50,000 unit capsule Take 50,000 Units by mouth.  Omega-3-DHA-EPA-Fish Oil 1,000 mg (120 mg-180 mg) cap Take  by mouth. No current facility-administered medications for this encounter. Objective:     Vitals:    05/18/22 0820   BP: 133/74   Pulse: 76   Resp: 18   Temp: 98.2 °F (36.8 °C)   SpO2: 97%   Weight: 115.7 kg (255 lb)   Height: 5' 4.5\" (1.638 m)       Physical Exam:  Ambulation: Normal  Gen- the patient is well developed and in no acute distress  HEENT- no focal abnormalities of the scalp or face. Neck- no JVD or retractions  Lungs- normal respiratory effort. Cardiovascular:  Lower limb pulses: 2+. Abd- soft and no masses evident. Ext- warm without cyanosis. There is mild lower leg edema. Skin- no jaundice or rashes. Venous ulcer left lower extremity. Slightly larger, has good granulation tissue with small amount of slough in the base, seems to be filling in.  Please see the specific measurements and descriptions of the wound(s) below. There is no evidence of periwound induration or warmth. No purulence or odor. Neuro- alert and oriented x 3. No gross motor deficits are present. Lower limb sensation is intact. Psychological: Affect normal. Mood normal. Memory intact.      Wound Abdomen Lower (Active)   Number of days: 736       Wound Perineum (Active)   Number of days: 736       Wound Vagina (Active)   Number of days: 736       Wound Abdomen Left (Active)   Number of days: 736       Wound Abdomen Right (Active)   Number of days: 736       Wound Umbilicus (Active)   Number of days: 736       Wound Pretibial Distal;Left;Medial (Active)   Wound Image   05/18/22 0836   Wound Etiology Venous 05/18/22 0836   Dressing Status Intact 05/18/22 0836   Cleansed Cleansed with saline 05/18/22 0836   Dressing/Treatment Xeroform;Band-Aid/Adhesive bandage 05/18/22 0836   Wound Length (cm) 1.2 cm 05/18/22 0836   Wound Width (cm) 0.7 cm 05/18/22 0836   Wound Depth (cm) 0.2 cm 05/18/22 0836   Wound Surface Area (cm^2) 0.84 cm^2 05/18/22 0836   Change in Wound Size % 20 05/18/22 0836   Wound Volume (cm^3) 0.168 cm^3 05/18/22 0836   Wound Healing % 20 05/18/22 0836   Wound Assessment Granulation tissue;Slough 05/18/22 0836   Drainage Amount Small 05/18/22 0836   Drainage Description Serosanguinous 05/18/22 0836   Wound Odor None 05/18/22 0836   Christin-Wound/Incision Assessment Hemosiderin staining (brown yellow) 05/18/22 0836   Wound Thickness Description Full thickness 05/18/22 0836   Number of days: 41        Assessment:   Venous ulcer left lower extremity  Patient Active Problem List   Diagnosis Code    Otitis media with effusion H65.90    Cough R05.9    Narcolepsy without cataplexy(347.00) G47.419    Obesity, morbid (HCC) E66.01    Intramural leiomyoma of uterus D25.1    S/P hysterectomy Z90.710    Venous ulcer ([address] I83.009, L97.909     Plan:   Plan to switch to dressing changes with collagen and Tubigrip, follow-up in 2 weeks for recheck. Follow-up Information     Follow up With Specialties Details Why Contact Info    [address] [place] In 2 weeks For wound re-check [address]  [phone]            Thank you very much for the opportunity to participate in this patient's care. Signed By: Matilde Dan MD     May 18, 2022        TIME:  If total time for today's E/M service is used for level of service it is documented below. This time includes physician non-face-to-face service time visit on the date of service and includes    Preparing to see the patient (eg, review of tests)  Obtaining and/or reviewing separately obtained history  Performing a medically necessary appropriate examination and/or evaluation  Counseling and educating the patient/family/caregiver  Ordering medications, tests, or procedures  Referring and communicating with other health care professionals as needed  Documenting clinical information in the electronic or other health record  Independently interpreting results (not reported separately) and communicating results to the patient/family/caregiver  Care coordination (not reported separately)    E/M time =     20     minutes.

## 2022-05-18 NOTE — PROGRESS NOTES
05/18/22 0836   Wound Pretibial Distal;Left;Medial   Date First Assessed/Time First Assessed: 04/07/22 0845   Present on Hospital Admission: Yes  Wound Approximate Age at First Assessment (Weeks): 4 weeks  Primary Wound Type: Traumatic  Location: Pretibial  Wound Location Orientation: Distal;Left;Medial   Wound Image    Wound Etiology Venous   Dressing Status Intact   Cleansed Cleansed with saline   Dressing/Treatment Xeroform;Band-Aid/Adhesive bandage   Wound Length (cm) 1.2 cm   Wound Width (cm) 0.7 cm   Wound Depth (cm) 0.2 cm   Wound Surface Area (cm^2) 0.84 cm^2   Change in Wound Size % 20   Wound Volume (cm^3) 0.168 cm^3   Wound Healing % 20   Wound Assessment Granulation tissue;Slough   Drainage Amount Small   Drainage Description Serosanguinous   Wound Odor None   Christin-Wound/Incision Assessment Hemosiderin staining (brown yellow)   Wound Thickness Description Full thickness

## 2022-05-18 NOTE — DISCHARGE INSTRUCTIONS
[address]  [address], [address]   Phone: [phone]  Fax: [phone]    Patient: Marito Jha MRN: [id number]  SSN: [id number]    YOB: 1970  Age: 50 y.o. Sex: female       Return Appointment: 2 weeks with Dr. Natalie Crain    Instructions: Left lower leg wound:  Cleanse with normal saline  Apply moisture barrier to wound periphery  Puracol Plus (may substitute equivalent collagen):cut to approximate wound size, apply to wound bed. Cover with latex free bandaid  Change 3 times per week     Do not get left leg dressing wet in shower  May purchase a cast cover at [place] [address] or [facility] for showering     Wear tubigrip sock to left leg and compression sock to Right lower leg  Put on in the morning and may remove at night     Elevate legs as much as possible  Avoid standing for prolonged periods of time as much as possible  Begin taking protein supplement such as José Miguel 2 times daily to promote increase wound healing. Should you experience increased redness, swelling, pain, foul odor, size of wound(s), or have a temperature over 101 degrees please contact the [address] [place] [address] Road at [phone] or if after hours contact your primary care physician or go to the hospital emergency department.     Signed By: Sahil Chatman PT, [facility]     May 18, 2022

## 2022-06-01 ENCOUNTER — HOSPITAL ENCOUNTER (OUTPATIENT)
Dept: WOUND CARE | Age: 52
Setting detail: RECURRING SERIES
Discharge: HOME | End: 2022-06-01
Payer: COMMERCIAL

## 2022-06-01 VITALS
WEIGHT: 258.8 LBS | RESPIRATION RATE: 18 BRPM | HEIGHT: 64 IN | TEMPERATURE: 98.9 F | HEART RATE: 84 BPM | SYSTOLIC BLOOD PRESSURE: 120 MMHG | BODY MASS INDEX: 44.18 KG/M2 | DIASTOLIC BLOOD PRESSURE: 72 MMHG

## 2022-06-01 DIAGNOSIS — I83.009 VENOUS ULCER (HCC): Primary | ICD-10-CM

## 2022-06-01 DIAGNOSIS — L97.909 VENOUS ULCER (HCC): Primary | ICD-10-CM

## 2022-06-01 PROCEDURE — 99213 OFFICE O/P EST LOW 20 MIN: CPT | Performed by: SURGERY

## 2022-06-01 PROCEDURE — 99213 OFFICE O/P EST LOW 20 MIN: CPT

## 2022-06-01 RX ORDER — BETAMETHASONE DIPROPIONATE 0.05 %
OINTMENT (GRAM) TOPICAL ONCE
Status: CANCELLED | OUTPATIENT
Start: 2022-06-01 | End: 2022-06-01

## 2022-06-01 RX ORDER — CLOBETASOL PROPIONATE 0.5 MG/G
OINTMENT TOPICAL ONCE
Status: CANCELLED | OUTPATIENT
Start: 2022-06-01 | End: 2022-06-01

## 2022-06-01 RX ORDER — LIDOCAINE HYDROCHLORIDE 20 MG/ML
JELLY TOPICAL ONCE
Status: CANCELLED | OUTPATIENT
Start: 2022-06-01 | End: 2022-06-01

## 2022-06-01 RX ORDER — LIDOCAINE 40 MG/G
CREAM TOPICAL ONCE
Status: CANCELLED | OUTPATIENT
Start: 2022-06-01 | End: 2022-06-01

## 2022-06-01 RX ORDER — GINSENG 100 MG
CAPSULE ORAL ONCE
Status: CANCELLED | OUTPATIENT
Start: 2022-06-01 | End: 2022-06-01

## 2022-06-01 RX ORDER — LIDOCAINE 50 MG/G
OINTMENT TOPICAL ONCE
Status: CANCELLED | OUTPATIENT
Start: 2022-06-01 | End: 2022-06-01

## 2022-06-01 RX ORDER — BACITRACIN, NEOMYCIN, POLYMYXIN B 400; 3.5; 5 [USP'U]/G; MG/G; [USP'U]/G
OINTMENT TOPICAL ONCE
Status: CANCELLED | OUTPATIENT
Start: 2022-06-01 | End: 2022-06-01

## 2022-06-01 RX ORDER — BACITRACIN ZINC AND POLYMYXIN B SULFATE 500; 1000 [USP'U]/G; [USP'U]/G
OINTMENT TOPICAL ONCE
Status: CANCELLED | OUTPATIENT
Start: 2022-06-01 | End: 2022-06-01

## 2022-06-01 RX ORDER — OMEGA-3S/DHA/EPA/FISH OIL/D3 300MG-1000
400 CAPSULE ORAL DAILY
COMMUNITY

## 2022-06-01 RX ORDER — GENTAMICIN SULFATE 1 MG/G
OINTMENT TOPICAL ONCE
Status: CANCELLED | OUTPATIENT
Start: 2022-06-01 | End: 2022-06-01

## 2022-06-01 RX ORDER — LIDOCAINE HYDROCHLORIDE 40 MG/ML
SOLUTION TOPICAL ONCE
Status: CANCELLED | OUTPATIENT
Start: 2022-06-01 | End: 2022-06-01

## 2022-06-01 RX ORDER — CHLORAL HYDRATE 500 MG
1 CAPSULE ORAL DAILY
COMMUNITY

## 2022-06-01 NOTE — PROGRESS NOTES
[address], [address], [address] [place] [address] Rd  [phone]    Progress Notes   Daren Lopez       MRN: [id number]     : 1970     Age: 50 y.o. Subjective/HPI:   This patient is a 50 y.o. female seen and evaluated at the wound clinic for follow-up of venous ulcer of the left lower extremity. Patient has been receiving dressing changes with collagen and Tubigrip. On today's visit she denies having any unusual pain or discomfort nor has she noted any redness or purulent drainage. She has very little serous drainage from the ulcer. She continues to ambulate without difficulty and reports no changes in her overall health since her previous visit. Review of Systems  Pertinent items are noted in HPI. Past Medical History:   Diagnosis Date    Abnormal Papanicolaou smear of cervix     Cough 2016    Gilbert's syndrome     elevated bilirubin levels    Hard of hearing     Headache     HPV (human papilloma virus) infection     Hypersomnia     Hypertension     Morbid obesity ([address]     Narcolepsy without cataplexy(347.00) 2016    not taking medication    Nausea & vomiting     Otitis media with effusion 2016      Past Surgical History:   Procedure Laterality Date    CHOLECYSTECTOMY      COLPOSCOPY      HYSTERECTOMY  2020    and tubes only    HYSTERECTOMY      LEEP      OTHER SURGICAL HISTORY      cyst on tailbone      Allergies   Allergen Reactions    Latex Rash    Penicillins Other (See Comments)     Other reaction(s): Unknown-Unspecified  Unknown reaction. Patient was a child when reaction occurred.       Sulfa Antibiotics Itching    Lavender Oil Itching and Rash      Social History     Tobacco Use    Smoking status: Never Smoker    Smokeless tobacco: Never Used   Substance Use Topics    Alcohol use: No      Social History     Social History Narrative    Not on file     Family History   Problem Relation Age of Onset    Colon Cancer Neg Hx     Breast Cancer Other         [de-identified] AUNT   [place] Breast Cancer Other         MOTHERS COUSIN    Diabetes Paternal Grandfather     Thyroid Disease Brother     Other Mother         Gilbert's syndrome    Cancer Maternal Grandmother         cancer of the uretha    Stroke Father     Heart Disease Father     Thyroid Disease Father     Diabetes Father     Ovarian Cancer Neg Hx       [unfilled]  Current Outpatient Medications   Medication Sig    Omega-3 Fatty Acids (FISH OIL) 1000 MG CAPS Take 1 capsule by mouth daily    ELDERBERRY PO Take 1 tablet by mouth daily    cyanocobalamin 500 MCG tablet Take 500 mcg by mouth daily    ergocalciferol (ERGOCALCIFEROL) 1.25 MG (22111 UT) capsule Take 50,000 Units by mouth     No current facility-administered medications for this encounter. Objective:     Vitals:    06/01/22 0952   BP: 120/72   Pulse: 84   Resp: 18   Temp: 98.9 °F (37.2 °C)   TempSrc: Temporal       Physical Exam:  Ambulation: Normal  Gen- the patient is well developed and in no acute distress  HEENT- no focal abnormalities of the scalp or face. Neck- no JVD or retractions  Lungs- normal respiratory effort. Cardiovascular:  Lower limb pulses: 3+. Abd- soft and no masses evident. Ext- warm without cyanosis. There is mild lower leg edema. Skin- no jaundice or rashes. Stasis dermatitis around ankle, ulcer appears to have new epithelialization. Please see the specific measurements and descriptions of the wound(s) below. There is no evidence of periwound induration or warmth. No purulence or odor. Neuro- alert and oriented x 3. No gross motor deficits are present. Lower limb sensation is intact. Psychological: Affect normal. Mood normal. Memory intact.          Assessment:   Venous ulcer left lower extremity, improved  Patient Active Problem List   Diagnosis    Otitis media with effusion    Cough    Intramural leiomyoma of uterus    S/P hysterectomy    Obesity, morbid ([address]    Venous ulcer ([facility]     Plan:     Continue dressing changes with collagen, use compression socks instead of Tubigrip, follow-up in 3 weeks for recheck. [unfilled]      Thank you very much for the opportunity to participate in this patient's care. [unfilled]    TIME:  If total time for today's E/M service is used for level of service it is documented below. This time includes physician non-face-to-face service time visit on the date of service and includes    Preparing to see the patient (eg, review of tests)  Obtaining and/or reviewing separately obtained history  Performing a medically necessary appropriate examination and/or evaluation  Counseling and educating the patient/family/caregiver  Ordering medications, tests, or procedures  Referring and communicating with other health care professionals as needed  Documenting clinical information in the electronic or other health record  Independently interpreting results (not reported separately) and communicating results to the patient/family/caregiver  Care coordination (not reported separately)    E/M time =     20     minutes.

## 2022-06-01 NOTE — PERIOP NOTE
Patient verified name, , and procedure. Type: 1a; abbreviated assessment per anesthesia guidelines    Labs per anesthesia: none    Instructed pt that they will be notified the day before their procedure by the GI Lab for time of arrival if their procedure is [facility] and Pre-op for [place] cases. Arrival times should be called by 5 pm. If no phone is received the patient should contact their respective hospital. The GI lab telephone number is [phone] and ES Pre-op is [phone]. Follow diet and prep instructions per office including NPO status. If patient has NOT received instructions from office patient is advised to call surgeon office, verbalizes understanding. Bath or shower the night before and the am of surgery with non-mositurizing soap. No lotions, oils, powders, cologne on skin. No make up, eye make up or jewelry. Wear loose fitting comfortable, clean clothing. Must have adult present in building the entire time . Medications for the day of procedure- none, patient to hold- vitamins and supplements. The following discharge instructions reviewed with patient: medication given during procedure may cause drowsiness for several hours, therefore, do not drive or operate machinery for remainder of the day. You may not drink alcohol on the day of your procedure, please resume regular diet and activity unless otherwise directed. You may experience abdominal distention for several hours that is relieved by the passage of gas. Contact your physician if you have any of the following: fever or chills, severe abdominal pain or excessive amount of bleeding or a large amount when having a bowel movement.  Occasional specks of blood with bowel movement would not be unusual.

## 2022-06-01 NOTE — FLOWSHEET NOTE
06/01/22 0957   Wound 04/07/22 Pretibial Left;Distal;Medial #1   Date First Assessed/Time First Assessed: 04/07/22 0926   Present on Hospital Admission: Yes  Primary Wound Type: Venous Ulcer  Location: Pretibial  Wound Location Orientation: Left;Distal;Medial  Wound Description (Comments): #1   Wound Image    Wound Etiology Venous   Dressing Status Intact   Wound Cleansed Cleansed with saline   Dressing/Treatment Collagen; Adhesive bandage   Wound Length (cm) 1.1 cm   Wound Width (cm) 0.5 cm   Wound Depth (cm) 0.4 cm   Wound Surface Area (cm^2) 0.55 cm^2   Wound Volume (cm^3) 0.22 cm^3   Wound Assessment Epithelialization;Pink/red   Drainage Amount Moderate   Drainage Description Serosanguinous   Odor None   Sandra-wound Assessment Intact   Wound Thickness Description not for Pressure Injury Full thickness   Kiet Scale   Sensory Perceptions 4   Moisture 4   Activity 4   Mobility 4   Nutrition 4   Friction and Shear 3   Kiet Scale Score 23

## 2022-06-01 NOTE — WOUND CARE
Discharge Instructions for  Sammie Bonner  [address] [place] [address]  [address], [address]   Phone [phone]   Fax [phone]      NAME:  Rogelio De Souza  YOB: 1970  MEDICAL RECORD NUMBER:  [id number]  DATE:  6/1/2022    Return Appointment:   3 weeks with Dr. Prem Wallis MD      Instructions: Left lower leg wound:  Cleanse with normal saline  Apply moisture barrier to wound periphery  Puracol Plus (may substitute equivalent collagen):cut to approximate wound size, apply to wound bed. Cover with latex free bandaid  Change 3 times per week     Do not get left leg dressing wet in shower  May purchase a cast cover at [place] or [facility] for showering     Wear tubigrip sock or compression sock to left leg and compression sock to Right lower leg  Put on in the morning and may remove at night     Elevate legs as much as possible  Avoid standing for prolonged periods of time as much as possible  Begin taking protein supplement such as Mykel 2 times daily to promote increase wound healing. Should you experience increased redness, swelling, pain, foul odor, size of wound(s), or have a temperature over 101 degrees please contact the [address] [place] [address] Road at [phone] or if after hours contact your primary care physician or go to the hospital emergency department. PLEASE NOTE: IF YOU ARE UNABLE TO OBTAIN WOUND SUPPLIES, CONTINUE TO USE THE SUPPLIES YOU HAVE AVAILABLE UNTIL YOU ARE ABLE TO REACH US. IT IS MOST IMPORTANT TO KEEP THE WOUND COVERED AT ALL TIMES.     Electronically signed Alicia Mcmanus RN on 6/1/2022 at 10:11 AM

## 2022-06-07 ENCOUNTER — ANESTHESIA EVENT (OUTPATIENT)
Dept: ENDOSCOPY | Age: 52
End: 2022-06-07
Payer: COMMERCIAL

## 2022-06-07 RX ORDER — SODIUM CHLORIDE 0.9 % (FLUSH) 0.9 %
5-40 SYRINGE (ML) INJECTION PRN
Status: CANCELLED | OUTPATIENT
Start: 2022-06-07

## 2022-06-07 RX ORDER — SODIUM CHLORIDE 0.9 % (FLUSH) 0.9 %
5-40 SYRINGE (ML) INJECTION EVERY 12 HOURS SCHEDULED
Status: CANCELLED | OUTPATIENT
Start: 2022-06-07

## 2022-06-07 RX ORDER — SODIUM CHLORIDE, SODIUM LACTATE, POTASSIUM CHLORIDE, CALCIUM CHLORIDE 600; 310; 30; 20 MG/100ML; MG/100ML; MG/100ML; MG/100ML
100 INJECTION, SOLUTION INTRAVENOUS CONTINUOUS
Status: CANCELLED | OUTPATIENT
Start: 2022-06-07

## 2022-06-08 ENCOUNTER — HOSPITAL ENCOUNTER (OUTPATIENT)
Age: 52
Setting detail: OUTPATIENT SURGERY
Discharge: HOME | End: 2022-06-08
Attending: SURGERY | Admitting: SURGERY
Payer: COMMERCIAL

## 2022-06-08 ENCOUNTER — ANESTHESIA (OUTPATIENT)
Dept: ENDOSCOPY | Age: 52
End: 2022-06-08
Payer: COMMERCIAL

## 2022-06-08 VITALS
OXYGEN SATURATION: 98 % | SYSTOLIC BLOOD PRESSURE: 105 MMHG | DIASTOLIC BLOOD PRESSURE: 64 MMHG | RESPIRATION RATE: 12 BRPM | WEIGHT: 259.6 LBS | BODY MASS INDEX: 43.25 KG/M2 | HEIGHT: 65 IN | TEMPERATURE: 98 F | HEART RATE: 74 BPM

## 2022-06-08 PROCEDURE — 7100000010 HC PHASE II RECOVERY - FIRST 15 MIN: Performed by: SURGERY

## 2022-06-08 PROCEDURE — 2709999900 HC NON-CHARGEABLE SUPPLY: Performed by: SURGERY

## 2022-06-08 PROCEDURE — 45378 DIAGNOSTIC COLONOSCOPY: CPT | Performed by: SURGERY

## 2022-06-08 PROCEDURE — 6360000002 HC RX W HCPCS: Performed by: REGISTERED NURSE

## 2022-06-08 PROCEDURE — 7100000011 HC PHASE II RECOVERY - ADDTL 15 MIN: Performed by: SURGERY

## 2022-06-08 PROCEDURE — 2580000003 HC RX 258: Performed by: ANESTHESIOLOGY

## 2022-06-08 PROCEDURE — 3609027000 HC COLONOSCOPY: Performed by: SURGERY

## 2022-06-08 PROCEDURE — 3700000001 HC ADD 15 MINUTES (ANESTHESIA): Performed by: SURGERY

## 2022-06-08 PROCEDURE — 2500000003 HC RX 250 WO HCPCS: Performed by: REGISTERED NURSE

## 2022-06-08 PROCEDURE — 3700000000 HC ANESTHESIA ATTENDED CARE: Performed by: SURGERY

## 2022-06-08 RX ORDER — PROPOFOL 10 MG/ML
INJECTION, EMULSION INTRAVENOUS PRN
Status: DISCONTINUED | OUTPATIENT
Start: 2022-06-08 | End: 2022-06-08 | Stop reason: SDUPTHER

## 2022-06-08 RX ORDER — LIDOCAINE HYDROCHLORIDE 10 MG/ML
1 INJECTION, SOLUTION INFILTRATION; PERINEURAL
Status: DISCONTINUED | OUTPATIENT
Start: 2022-06-08 | End: 2022-06-08 | Stop reason: HOSPADM

## 2022-06-08 RX ORDER — SODIUM CHLORIDE, SODIUM LACTATE, POTASSIUM CHLORIDE, CALCIUM CHLORIDE 600; 310; 30; 20 MG/100ML; MG/100ML; MG/100ML; MG/100ML
INJECTION, SOLUTION INTRAVENOUS CONTINUOUS
Status: DISCONTINUED | OUTPATIENT
Start: 2022-06-08 | End: 2022-06-08 | Stop reason: HOSPADM

## 2022-06-08 RX ORDER — SODIUM CHLORIDE 9 MG/ML
INJECTION, SOLUTION INTRAVENOUS PRN
Status: DISCONTINUED | OUTPATIENT
Start: 2022-06-08 | End: 2022-06-08 | Stop reason: HOSPADM

## 2022-06-08 RX ORDER — LIDOCAINE HYDROCHLORIDE 20 MG/ML
INJECTION, SOLUTION EPIDURAL; INFILTRATION; INTRACAUDAL; PERINEURAL PRN
Status: DISCONTINUED | OUTPATIENT
Start: 2022-06-08 | End: 2022-06-08 | Stop reason: SDUPTHER

## 2022-06-08 RX ORDER — SODIUM CHLORIDE 0.9 % (FLUSH) 0.9 %
5-40 SYRINGE (ML) INJECTION EVERY 12 HOURS SCHEDULED
Status: DISCONTINUED | OUTPATIENT
Start: 2022-06-08 | End: 2022-06-08 | Stop reason: HOSPADM

## 2022-06-08 RX ORDER — SODIUM CHLORIDE 0.9 % (FLUSH) 0.9 %
5-40 SYRINGE (ML) INJECTION PRN
Status: DISCONTINUED | OUTPATIENT
Start: 2022-06-08 | End: 2022-06-08 | Stop reason: HOSPADM

## 2022-06-08 RX ADMIN — PROPOFOL 40 MG: 10 INJECTION, EMULSION INTRAVENOUS at 08:06

## 2022-06-08 RX ADMIN — Medication 30 MG: at 08:03

## 2022-06-08 RX ADMIN — SODIUM CHLORIDE, POTASSIUM CHLORIDE, SODIUM LACTATE AND CALCIUM CHLORIDE: 600; 310; 30; 20 INJECTION, SOLUTION INTRAVENOUS at 06:59

## 2022-06-08 RX ADMIN — PROPOFOL 100 MG: 10 INJECTION, EMULSION INTRAVENOUS at 08:03

## 2022-06-08 RX ADMIN — PROPOFOL 200 MCG/KG/MIN: 10 INJECTION, EMULSION INTRAVENOUS at 08:04

## 2022-06-08 ASSESSMENT — PAIN SCALES - GENERAL
PAINLEVEL_OUTOF10: 0

## 2022-06-08 ASSESSMENT — PAIN - FUNCTIONAL ASSESSMENT: PAIN_FUNCTIONAL_ASSESSMENT: 0-10

## 2022-06-08 NOTE — ANESTHESIA POSTPROCEDURE EVALUATION
Department of Anesthesiology  Postprocedure Note    Patient: Annie Wong  MRN: [id number]  YOB: 1970  Date of evaluation: 6/8/2022  Time:  9:43 AM     Procedure Summary     Date: 06/08/22 Room / Location: [facility] ENDO 01 / [facility] ENDOSCOPY    Anesthesia Start: 6045 Anesthesia Stop: 4258    Procedure: COLORECTAL CANCER SCREENING, NOT HIGH RISK/43 (N/A ) Diagnosis:       Screen for colon cancer      (Screen for colon cancer [Z12.11])    Providers: Tiffanie Burroughs MD Responsible Provider: Tash Gunter MD    Anesthesia Type: MAC ASA Status: 3          Anesthesia Type: MAC    Isatu Phase I: Isatu Score: 9    Isatu Phase II: Isatu Score: 9    Last vitals: Reviewed and per EMR flowsheets.        Anesthesia Post Evaluation    Patient location during evaluation: PACU  Patient participation: complete - patient participated  Level of consciousness: awake and alert  Airway patency: patent  Nausea & Vomiting: no nausea  Cardiovascular status: hemodynamically stable  Respiratory status: acceptable  Hydration status: euvolemic

## 2022-06-08 NOTE — H&P
History and Physical      Patient: Tim Ramírez    Physician: Mike Morales MD    Referring Physician: Radhames Barrios DO    Chief Complaint: For colonoscopy    History of Present Illness: Pt presents for colonoscopy. Initial Screening for colorectal cancer . History:  Past Medical History:   Diagnosis Date    Abnormal Papanicolaou smear of cervix     Cough 8/17/2016    Gilbert's syndrome     elevated bilirubin levels    Hard of hearing     Headache     HPV (human papilloma virus) infection     Hypersomnia     Hypertension     Pt denies, states family hx only    Leg ulcer, left ([address]     Ankle- treated by wound clinic     Morbid obesity ([address]     Narcolepsy without cataplexy(347.00) 08/17/2016    not taking medication    Nausea & vomiting     Otitis media with effusion 8/17/2016     Past Surgical History:   Procedure Laterality Date    CHOLECYSTECTOMY      COLPOSCOPY      HYSTERECTOMY  05/2020    and tubes only    LEEP  2000    OTHER SURGICAL HISTORY  1990    cyst on tailbone      Social History     Socioeconomic History    Marital status:      Spouse name: None    Number of children: None    Years of education: None    Highest education level: None   Occupational History    None   Tobacco Use    Smoking status: Never Smoker    Smokeless tobacco: Never Used   Vaping Use    Vaping Use: Never used   Substance and Sexual Activity    Alcohol use: No    Drug use: No    Sexual activity: None   Other Topics Concern    None   Social History Narrative    None     Social Determinants of Health     Financial Resource Strain:     Difficulty of Paying Living Expenses: Not on file   Food Insecurity:     Worried About Running Out of Food in the Last Year: Not on file    Riccardo of Food in the Last Year: Not on file   Transportation Needs:     Lack of Transportation (Medical): Not on file    Lack of Transportation (Non-Medical):  Not on file   Physical Activity:     Days of Exercise per Week: Not on file    Minutes of Exercise per Session: Not on file   Stress:     Feeling of Stress : Not on file   Social Connections:     Frequency of Communication with Friends and Family: Not on file    Frequency of Social Gatherings with Friends and Family: Not on file    Attends [employer] Services: Not on file    Active Member of [address] [place] [address] [employer] or Organizations: Not on file    Attends Club or Organization Meetings: Not on file    Marital Status: Not on file   Intimate Partner Violence:     Fear of Current or Ex-Partner: Not on file    Emotionally Abused: Not on file    Physically Abused: Not on file    Sexually Abused: Not on file   Housing Stability:     Unable to Pay for Housing in the Last Year: Not on file    Number of [place] in the Last Year: Not on file    Unstable Housing in the Last Year: Not on file      Family History   Problem Relation Age of Onset    Colon Cancer Neg Hx     Breast Cancer Other         MOTHER'S AUNT    Breast Cancer Other         MOTHERS COUSIN    Diabetes Paternal Grandfather     Thyroid Disease Brother     Other Mother         Gilbert's syndrome    Cancer Maternal Grandmother         cancer of the uretha    Stroke Father     Heart Disease Father     Thyroid Disease Father     Diabetes Father     Ovarian Cancer Neg Hx        Medications:   Prior to Admission medications    Medication Sig Start Date End Date Taking? Authorizing Provider   vitamin D3 (CHOLECALCIFEROL) 10 MCG (400 UNIT) TABS tablet Take 400 Units by mouth daily   Yes Historical Provider, MD   Omega-3 Fatty Acids (FISH OIL) 1000 MG CAPS Take 1 capsule by mouth daily    Historical Provider, MD   ELDERBERRY PO Take 1 tablet by mouth daily    Historical Provider, MD   cyanocobalamin 500 MCG tablet Take 500 mcg by mouth daily    Ar Automatic Reconciliation       Allergies:    Allergies   Allergen Reactions    Latex Rash    Penicillins Other (See Comments)     Other reaction(s): Unknown-Unspecified  Unknown reaction. Patient was a child when reaction occurred.  Sulfa Antibiotics Itching    Lavender Oil Itching and Rash       Physical Exam:     Vital Signs: /78   Pulse 82   Temp 98.6 °F (37 °C) (Temporal)   Resp 14   Ht 5' 4.5\" (1.638 m)   Wt 259 lb 9.6 oz (117.8 kg)   SpO2 98%   BMI 43.87 kg/m²   . General: no distress      Heart: regular   Lungs: unlabored   Abdominal: soft   Neurological: Grossly normal        Findings/Diagnosis: Screening for colorectal cancer     Plan of Care/Planned Procedure: Colonoscopy, possible polypectomy. Pt/designee has reviewed the colonoscopy information sheet. Any questions have been discussed. They agree to proceed.       Signed:  Manfred Jackson MD   6/8/2022

## 2022-06-08 NOTE — ANESTHESIA PRE PROCEDURE
Department of Anesthesiology  Preprocedure Note       Name:  Lindsay Yuan   Age:  50 y.o.  :  1970                                          MRN:  [id number]         Date:  2022      Surgeon: Abril Chowdhury):  Carissa Shaikh MD    Procedure: Procedure(s):  COLORECTAL CANCER SCREENING, NOT HIGH RISK/43    Medications prior to admission:   Prior to Admission medications    Medication Sig Start Date End Date Taking? Authorizing Provider   vitamin D3 (CHOLECALCIFEROL) 10 MCG (400 UNIT) TABS tablet Take 400 Units by mouth daily   Yes Historical Provider, MD   Omega-3 Fatty Acids (FISH OIL) 1000 MG CAPS Take 1 capsule by mouth daily    Historical Provider, MD   ELDERBERRY PO Take 1 tablet by mouth daily    Historical Provider, MD   cyanocobalamin 500 MCG tablet Take 500 mcg by mouth daily    Ar Automatic Reconciliation       Current medications:    Current Facility-Administered Medications   Medication Dose Route Frequency Provider Last Rate Last Admin    lidocaine 1 % injection 1 mL  1 mL IntraDERmal Once PRN May Bernal MD        lactated ringers infusion   IntraVENous Continuous May Bernal MD        sodium chloride flush 0.9 % injection 5-40 mL  5-40 mL IntraVENous 2 times per day May Bernal MD        sodium chloride flush 0.9 % injection 5-40 mL  5-40 mL IntraVENous PRN May Bernal MD        0.9 % sodium chloride infusion   IntraVENous PRN May Bernal MD           Allergies: Allergies   Allergen Reactions    Latex Rash    Penicillins Other (See Comments)     Other reaction(s): Unknown-Unspecified  Unknown reaction. Patient was a child when reaction occurred.       Sulfa Antibiotics Itching    Lavender Oil Itching and Rash       Problem List:    Patient Active Problem List   Diagnosis Code    Otitis media with effusion H65.90    Cough R05.9    Intramural leiomyoma of uterus D25.1    S/P hysterectomy Z90.710    Obesity, morbid ([address] E66.01    Venous ulcer ([facility] I83.009, L97.909       Past Medical History:        Diagnosis Date    Abnormal Papanicolaou smear of cervix     Cough 8/17/2016    Gilbert's syndrome     elevated bilirubin levels    Hard of hearing     Headache     HPV (human papilloma virus) infection     Hypersomnia     Hypertension     Pt denies, states family hx only    Leg ulcer, left ([facility]     Ankle- treated by wound clinic     Morbid obesity ([facility]     Narcolepsy without cataplexy(347.00) 08/17/2016    not taking medication    Nausea & vomiting     Otitis media with effusion 8/17/2016       Past Surgical History:        Procedure Laterality Date    CHOLECYSTECTOMY      COLPOSCOPY      HYSTERECTOMY  05/2020    and tubes only    LEEP  2000    OTHER SURGICAL HISTORY  1990    cyst on tailbone       Social History:    Social History     Tobacco Use    Smoking status: Never Smoker    Smokeless tobacco: Never Used   Substance Use Topics    Alcohol use: No                                Counseling given: Not Answered      Vital Signs (Current):   Vitals:    06/01/22 1421 06/08/22 0631 06/08/22 0635 06/08/22 0646   BP:  132/80     Pulse:  88     Resp:    16   Temp:  98.6 °F (37 °C)     TempSrc:  Temporal     SpO2:  97%     Weight: 255 lb (115.7 kg)  259 lb 9.6 oz (117.8 kg)    Height: 5' 4.5\" (1.638 m)                                                 BP Readings from Last 3 Encounters:   06/08/22 132/80   06/01/22 120/72   05/18/22 133/74       NPO Status: Time of last liquid consumption: 2100                        Time of last solid consumption: 1900                        Date of last liquid consumption: 06/07/22                        Date of last solid food consumption: 06/06/22    BMI:   Wt Readings from Last 3 Encounters:   06/08/22 259 lb 9.6 oz (117.8 kg)   06/01/22 258 lb 12.8 oz (117.4 kg)   05/18/22 255 lb (115.7 kg)     Body mass index is 43.87 kg/m².     CBC:   Lab Results   Component Value Date    HGB 14.9 05/11/2020 MCV 88.9 03/22/2022       CMP:   Lab Results   Component Value Date     03/22/2022    K 3.9 03/22/2022    CL 98 03/22/2022    CO2 22 03/22/2022    BUN 11 03/22/2022    CREATININE 0.78 03/22/2022    AGRATIO 1.6 03/22/2022    GLUCOSE 105 03/22/2022    PROT 7.3 03/22/2022    CALCIUM 9.7 03/22/2022    BILITOT 0.5 03/22/2022    ALKPHOS 51 03/22/2022    AST 18 03/22/2022    ALT 17 03/22/2022       POC Tests: No results for input(s): POCGLU, POCNA, POCK, POCCL, POCBUN, POCHEMO, POCHCT in the last 72 hours. Coags: No results found for: PROTIME, INR, APTT    HCG (If Applicable): No results found for: PREGTESTUR, PREGSERUM, HCG, HCGQUANT     ABGs: No results found for: PHART, PO2ART, KXJ5RDR, VQW7UIE, BEART, S2WLHSFD     Type & Screen (If Applicable):  No results found for: LABABO, LABRH    Drug/Infectious Status (If Applicable):  No results found for: HIV, HEPCAB    COVID-19 Screening (If Applicable):   Lab Results   Component Value Date    COVID19 Not Detected 05/08/2020           Anesthesia Evaluation  Patient summary reviewed  Airway: Mallampati: II          Dental: normal exam         Pulmonary:Negative Pulmonary ROS breath sounds clear to auscultation                             Cardiovascular:  Exercise tolerance: good (>4 METS),   (+) hypertension:,     (-) past MI, murmur and peripheral edema      Rhythm: regular  Rate: normal                    Neuro/Psych:   Negative Neuro/Psych ROS  (+) headaches:,    (-) CVA           GI/Hepatic/Renal: Neg GI/Hepatic/Renal ROS  (+) liver disease (Gilbert's syndrome):, morbid obesity          Endo/Other: Negative Endo/Other ROS                     ROS comment: Venous ulcer Abdominal:             Vascular: negative vascular ROS. Other Findings:           Anesthesia Plan      TIVA     ASA 3       Induction: intravenous. Anesthetic plan and risks discussed with patient.                         Levi Bunn MD   6/8/2022

## 2022-06-08 NOTE — OP NOTE
Operative Report    Patient: Goldie Day MRN: [id number]      YOB: 1970  Age: 50 y.o. Sex: female            Preoperative Diagnosis: screening exam    Postoperative Diagnosis: mild diverticulosis    Procedure:    CPTCODESCOLO: [id number]       Anesthesia: KIM-per anesthesia    Indications:  As outlined in the History and Physical.      Procedure in Detail:  Informed consent was obtained for the procedure. The patient was placed in the left lateral decubitus position and sedation was induced by anesthesia. The scope was inserted into the rectum and advanced under direct vision to the cecum, which was identified by the appendiceal orifice. The quality of the colonic preparation was excellent. A careful inspection was made as the colonoscope was withdrawn, including a retroflexed view of the rectum; findings and interventions are described below. Appropriate photodocumentation was obtained. Findings:     Normal mucosa throughout  Mild sigmoid diverticulosis        Specimens: No specimens were collected. Complications: None; patient tolerated the procedure well. \    EBL - none    Recommendations:   - For colon cancer screening in this average-risk patient, colonoscopy may be repeated in 10 years.       Signed By:  Trent Chanel MD     June 8, 2022

## 2022-06-22 ENCOUNTER — HOSPITAL ENCOUNTER (OUTPATIENT)
Dept: WOUND CARE | Age: 52
Setting detail: RECURRING SERIES
Discharge: HOME | End: 2022-06-22
Payer: COMMERCIAL

## 2022-06-22 VITALS
HEIGHT: 65 IN | WEIGHT: 264 LBS | BODY MASS INDEX: 43.99 KG/M2 | HEART RATE: 90 BPM | SYSTOLIC BLOOD PRESSURE: 103 MMHG | DIASTOLIC BLOOD PRESSURE: 70 MMHG | TEMPERATURE: 98.2 F

## 2022-06-22 DIAGNOSIS — I83.009 VENOUS ULCER (HCC): ICD-10-CM

## 2022-06-22 DIAGNOSIS — L97.909 VENOUS ULCER (HCC): ICD-10-CM

## 2022-06-22 PROCEDURE — 99213 OFFICE O/P EST LOW 20 MIN: CPT | Performed by: SURGERY

## 2022-06-22 PROCEDURE — 99213 OFFICE O/P EST LOW 20 MIN: CPT

## 2022-06-22 NOTE — PROGRESS NOTES
[address]   Progress Note     Annie Wong  MEDICAL RECORD NUMBER:  [id number]  AGE: 50 y.o. GENDER: female  : 1970  EPISODE DATE:  2022    Subjective:     Chief Complaint   Patient presents with    Wound Check     left lower leg medial 22         Has been tolerating wound care dressings without problems. Assessment:     Venous ulcer left lower extremity    Problem List Items Addressed This Visit        Other    Venous ulcer ([address]          Wound evaluation: Venous ulcer left lower extremity measures smaller, good granulation tissue and new epithelialization. Patient has moderate risk for morbidity and mortality due to conditions affecting wound healing discussed or addressed today: Venous insufficiency  Education and discussion held with patient regarding these disease processes especially issues related to the wound(s). Plan:   Continue dressing changes with collagen and compression sock to control swelling, follow-up in 3 weeks for recheck. Wound 22 Pretibial Left;Distal;Medial #1 (Active)   Wound Image   22 1029   Wound Etiology Venous 22 1029   Dressing Status Intact 22 1029   Wound Cleansed Cleansed with saline 22 1029   Dressing/Treatment Collagen; Adhesive bandage 22 1029   Wound Length (cm) 0.6 cm 22 1029   Wound Width (cm) 0.3 cm 22 1029   Wound Depth (cm) 0.2 cm 22 1029   Wound Surface Area (cm^2) 0.18 cm^2 22 1029   Change in Wound Size % (l*w) 67.27 22 1029   Wound Volume (cm^3) 0.036 cm^3 22 1029   Wound Healing % 84 22 1029   Wound Assessment Granulation tissue;Pink/red;Epithelialization 22 1029   Drainage Amount Scant 22 1029   Drainage Description Clear 22 1029   Odor None 22 1029   Sandra-wound Assessment Intact 22 1029   Margins Attached edges; Defined edges 22 1029   Wound Thickness Description not for Pressure Injury Full thickness 06/22/22 1029   Number of days: 76          1. Wound care: Any procedure as below (debridement, skin substitute application, biopsy, total contact casting, chemical cauterization). Please see attached Discharge Instructions for specific wound treatment plan  2. Offloading: Avoid pressure and trauma to wound N/A no pressure relief needed for this patient  3. Edema control: knee-high gradient compression stockings  4. Infection: no signs of acute infection. Continue to monitor. 5. Circulation: Available vascular imaging reviewed. N/A  6. Nutrition: stressed lean protein intake. 7. Most recent pertinent imaging and labs reviewed: N/A  8. Case discussed with other providers involved in the patient's care: N/A      HISTORY of PRESENT ILLNESS HPI     Suma Burton is a 50 y.o. female who presents today for wound/ulcer evaluation. History of Wound Context: Per original history and physical on this patient. Changes in history since last exam and/or wound center visit: Patient has been receiving dressing changes with collagen and using a compression sock to control swelling. On today's visit she is without complaints. She denies any unusual pain or discomfort nor has she noted any redness or drainage. She continues to take protein supplementation. No changes in overall health since previous visit. Objective:    /70   Pulse 90   Temp 98.2 °F (36.8 °C) (Oral)   Ht 5' 4.5\" (1.638 m)   Wt 264 lb (119.7 kg)   BMI 44.62 kg/m²   Wt Readings from Last 3 Encounters:   06/22/22 264 lb (119.7 kg)   06/08/22 259 lb 9.6 oz (117.8 kg)   06/01/22 258 lb 12.8 oz (117.4 kg)       PHYSICAL EXAM  General: alert and oriented to person, place and time, well-developed and well nourished, and in no acute distress. Skin: warm and dry, no rash. Head: normocephalic and atraumatic. Eyes: extraocular eye movements intact, conjunctivae normal, and sclera anicteric. ENT: hearing grossly normal bilaterally.  Normal appearance. Respiratory: no chest wall tenderness. no respiratory distress. GI: abdomen soft, non-tender and non-distended, benign. Musculoskeletal: normal range of motion of joints. Nontender calves. No cyanosis. Edema 1+. Neurologic: Speech normal. No focal deficits. Mental status normal.     PAST MEDICAL HISTORY        Diagnosis Date    Abnormal Papanicolaou smear of cervix     Cough 8/17/2016    Gilbert's syndrome     elevated bilirubin levels    Hard of hearing     Headache     HPV (human papilloma virus) infection     Hypersomnia     Hypertension     Pt denies, states family hx only    Leg ulcer, left ([address]     Ankle- treated by wound clinic     Morbid [facility] ([facility]     Narcolepsy without cataplexy(347.00) 08/17/2016    not taking medication    Nausea & vomiting     Otitis media with effusion 8/17/2016       PAST SURGICAL HISTORY    Past Surgical History:   Procedure Laterality Date    CHOLECYSTECTOMY      COLONOSCOPY N/A 6/8/2022    COLORECTAL CANCER SCREENING, NOT HIGH RISK/43 performed by Hi Guardado MD at [address] [facility] ([facility])  05/2020    and tubes only    LEEP  2000    OTHER SURGICAL HISTORY  1990    cyst on tailbone       FAMILY HISTORY    Family History   Problem Relation Age of Onset    Colon Cancer Neg Hx     Breast Cancer Other         [de-identified] AUNT    Breast Cancer Other         MOTHERS COUSIN    Diabetes Paternal Grandfather     Thyroid Disease Brother     Other Mother         Gilbert's syndrome    Cancer Maternal Grandmother         cancer of the uretha    Stroke Father     Heart Disease Father     Thyroid Disease Father     Diabetes Father     Ovarian Cancer Neg Hx        SOCIAL HISTORY    Social History     Tobacco Use    Smoking status: Never Smoker    Smokeless tobacco: Never Used   Vaping Use    Vaping Use: Never used   Substance Use Topics    Alcohol use: No    Drug use:  No ALLERGIES    Allergies   Allergen Reactions    Latex Rash    Penicillins Other (See Comments)     Other reaction(s): Unknown-Unspecified  Unknown reaction. Patient was a child when reaction occurred.  Sulfa Antibiotics Itching    Lavender Oil Itching and Rash       MEDICATIONS    Current Outpatient Medications on File Prior to Encounter   Medication Sig Dispense Refill    Omega-3 Fatty Acids (FISH OIL) 1000 MG CAPS Take 1 capsule by mouth daily      ELDERBERRY PO Take 1 tablet by mouth daily      vitamin D3 (CHOLECALCIFEROL) 10 MCG (400 UNIT) TABS tablet Take 400 Units by mouth daily      cyanocobalamin 500 MCG tablet Take 500 mcg by mouth daily       No current facility-administered medications on file prior to encounter. REVIEW OF SYSTEMS  A comprehensive review of systems was negative except for: None    Written patient dismissal instructions given to patient and signed by patient or POA. Discharge Instructions       Discharge Instructions for  [address] [place], [place] [address], [address] [facility]  Phone [phone]   Fax [phone]      NAME:  Silvia Tesfaye  YOB: 1970  MEDICAL RECORD NUMBER:  [id number]  DATE:  6/22/2022    Return Appointment:   3 weeks with Dr. Sunday Vegas MD      Instructions: Instructions: Left lower leg wound:  Cleanse with normal saline  Apply moisture barrier to wound periphery  Puracol Plus (may substitute equivalent collagen):cut to approximate wound size, apply to wound bed.   Cover with latex free bandaid  Change 3 times per week     Do not get left leg dressing wet in shower  May purchase a cast cover at [place] or [facility] for showering     Wear tubigrip sock or compression sock to left leg and compression sock to Right lower leg  Put on in the morning and may remove at night     Elevate legs as much as possible  Avoid standing for prolonged periods of time as much as possible  Begin taking protein supplement such as Mykel 2 times daily to promote increase wound healing.     Should you experience increased redness, swelling, pain, foul odor, size of wound(s), or have a temperature over 101 degrees please contact the [address] [place] [address] Road at [phone] or if after hours contact your primary care physician or go to the hospital emergency department. PLEASE NOTE: IF YOU ARE UNABLE TO OBTAIN WOUND SUPPLIES, CONTINUE TO USE THE SUPPLIES YOU HAVE AVAILABLE UNTIL YOU ARE ABLE TO REACH US. IT IS MOST IMPORTANT TO KEEP THE WOUND COVERED AT ALL TIMES. Electronically signed Jude Miller RN on 6/22/2022 at 10:38 AM           Thank you very much for the opportunity to participate in this patient's care. Electronically signed by Vijaya Bertrand MD on 6/22/22 at 11:13 AM EDT     TIME:  If total time for today's E/M service is used for level of service it is documented below.     This time includes physician non-face-to-face service time visit on the date of service and includes    Preparing to see the patient (eg, review of tests)  Obtaining and/or reviewing separately obtained history  Performing a medically necessary appropriate examination and/or evaluation  Counseling and educating the patient/family/caregiver  Ordering medications, tests, or procedures  Referring and communicating with other health care professionals as needed  Documenting clinical information in the electronic or other health record  Independently interpreting results (not reported separately) and communicating results to the patient/family/caregiver  Care coordination (not reported separately)    E/M time = 20 Minutes

## 2022-06-22 NOTE — FLOWSHEET NOTE
06/22/22 1029   Left Leg Edema Point of Measurement   Leg circumference 48 cm   Ankle circumference 28 cm   Foot circumference 23.5 cm   Compression Therapy Compression stockings   Peripheral Vascular   LLE Sensation  Full sensation   LLE Neurovascular Assessment   Capillary Refill Less than/Equal to 3 seconds   Color Pink   Temperature Warm   L Pedal Pulse +3   Wound 04/07/22 Pretibial Left;Distal;Medial #1   Date First Assessed/Time First Assessed: 04/07/22 0926   Present on Hospital Admission: Yes  Primary Wound Type: Venous Ulcer  Location: Pretibial  Wound Location Orientation: Left;Distal;Medial  Wound Description (Comments): #1   Wound Image    Wound Etiology Venous   Dressing Status Intact   Wound Cleansed Cleansed with saline   Dressing/Treatment Collagen; Adhesive bandage   Wound Length (cm) 0.6 cm   Wound Width (cm) 0.3 cm   Wound Depth (cm) 0.2 cm   Wound Surface Area (cm^2) 0.18 cm^2   Change in Wound Size % (l*w) 67.27   Wound Volume (cm^3) 0.036 cm^3   Wound Healing % 84   Wound Assessment Granulation tissue;Pink/red;Epithelialization   Drainage Amount Scant   Drainage Description Clear   Odor None   Sandra-wound Assessment Intact   Margins Attached edges; Defined edges   Wound Thickness Description not for Pressure Injury Full thickness

## 2022-06-22 NOTE — WOUND CARE
Discharge Instructions for  Sammie Bonner  [address] [place] [address]  Toni DUNCAN [address], [address] [facility]  Phone [phone]   Fax [phone]      NAME:  Kely Hernandez  YOB: 1970  MEDICAL RECORD NUMBER:  [id number]  DATE:  6/22/2022    Return Appointment:   3 weeks with Dr. Lexi Francis MD      Instructions: Instructions: Left lower leg wound:  Cleanse with normal saline  Apply moisture barrier to wound periphery  Puracol Plus (may substitute equivalent collagen):cut to approximate wound size, apply to wound bed. Cover with latex free bandaid  Change 3 times per week     Do not get left leg dressing wet in shower  May purchase a cast cover at [facility] or [facility] for showering     Wear tubigrip sock or compression sock to left leg and compression sock to Right lower leg  Put on in the morning and may remove at night     Elevate legs as much as possible  Avoid standing for prolonged periods of time as much as possible  Begin taking protein supplement such as Mykel 2 times daily to promote increase wound healing.     Should you experience increased redness, swelling, pain, foul odor, size of wound(s), or have a temperature over 101 degrees please contact the [address] [place] [address] Road at [phone] or if after hours contact your primary care physician or go to the hospital emergency department. PLEASE NOTE: IF YOU ARE UNABLE TO OBTAIN WOUND SUPPLIES, CONTINUE TO USE THE SUPPLIES YOU HAVE AVAILABLE UNTIL YOU ARE ABLE TO REACH US. IT IS MOST IMPORTANT TO KEEP THE WOUND COVERED AT ALL TIMES.     Electronically signed Estefany Tim RN on 6/22/2022 at 10:38 AM

## 2022-06-29 ENCOUNTER — OFFICE VISIT (OUTPATIENT)
Dept: OBGYN CLINIC | Age: 52
End: 2022-06-29
Payer: COMMERCIAL

## 2022-06-29 VITALS
SYSTOLIC BLOOD PRESSURE: 140 MMHG | BODY MASS INDEX: 44.32 KG/M2 | HEIGHT: 65 IN | DIASTOLIC BLOOD PRESSURE: 80 MMHG | WEIGHT: 266 LBS

## 2022-06-29 DIAGNOSIS — Z12.31 SCREENING MAMMOGRAM FOR BREAST CANCER: ICD-10-CM

## 2022-06-29 DIAGNOSIS — Z01.419 WELL WOMAN EXAM: Primary | ICD-10-CM

## 2022-06-29 PROCEDURE — 99396 PREV VISIT EST AGE 40-64: CPT | Performed by: OBSTETRICS & GYNECOLOGY

## 2022-06-29 ASSESSMENT — ENCOUNTER SYMPTOMS
GASTROINTESTINAL NEGATIVE: 1
ALLERGIC/IMMUNOLOGIC NEGATIVE: 1
EYES NEGATIVE: 1
ABDOMINAL PAIN: 0
BLOOD IN STOOL: 0
RESPIRATORY NEGATIVE: 1
SHORTNESS OF BREATH: 0
COUGH: 0

## 2022-06-29 NOTE — PROGRESS NOTES
Kely Hernandez is 50 y.o. female, Jose Manuel Carter, who presents today for a routine annual gynecological examination. No LMP recorded. Patient has had a hysterectomy. .has noticed some increased \"straining\" with BMs. Ow Doing well. No Gyn, Breast, G/U, or GI complaints. Mammogram/Pap Hx 2022   Mammogram Date 2021   Mammogram Result wnl   Pap Date 2019   Pap Result wnl     GYN Intake Questionnaire 2022   Current Form of Contraception Hysterectomy   Date of Mammogram 2021   Result of Mammogram wnl   Date of Pap 2019   Pap result wnl        Contraception: abstinence and status post hysterectomy   Has received Gardisil: NO   Last [place] 2022-WNL   Last time cholesterol was checked: 2022    OB History:   OB History    Para Term  AB Living   0 0 0 0 0 0   SAB IAB Ectopic Molar Multiple Live Births   0 0 0 0 0 0         Health Maintenance Due   Topic Date Due    HIV screen  Never done    Hepatitis C screen  Never done    DTaP/Tdap/Td vaccine (1 - Tdap) Never done    Diabetes screen  Never done    Shingles vaccine (1 of 2) Never done         GYN History            Iris  has a past medical history of Abnormal Papanicolaou smear of cervix, Cough, Gilbert's syndrome, Hard of hearing, Headache, HPV (human papilloma virus) infection, Hypersomnia, Hypertension, Leg ulcer, left ([address] Morbid obesity ([address] Narcolepsy without cataplexy(347.00), Nausea & vomiting, and Otitis media with effusion. .    Her surgeries include  has a past surgical history that includes LEEP (); Cholecystectomy; other surgical history (); Colposcopy; Laparoscopic hysterectomy (2020); and Colonoscopy (N/A, 2022). .    Allergies   Allergen Reactions    Latex Rash    Penicillins Other (See Comments)     Other reaction(s): Unknown-Unspecified  Unknown reaction. Patient was a child when reaction occurred.       Sulfa Antibiotics Itching    Lavender Oil Itching and Rash        Her current meds are:   Current Outpatient Medications   Medication Sig    COLLAGEN PO Take by mouth    Omega-3 Fatty Acids (FISH OIL) 1000 MG CAPS Take 1 capsule by mouth daily    ELDERBERRY PO Take 1 tablet by mouth daily    vitamin D3 (CHOLECALCIFEROL) 10 MCG (400 UNIT) TABS tablet Take 400 Units by mouth daily    cyanocobalamin 500 MCG tablet Take 500 mcg by mouth daily     No current facility-administered medications for this visit. Family history is significant for family history includes Breast Cancer in some other family members; Cancer in her maternal grandmother; Diabetes in her father and paternal grandfather; Heart Disease in her father; Other in her mother; Stroke in her father; Thyroid Disease in her brother and father. Moises Norris  reports that she has never smoked. She has never used smokeless tobacco. She reports that she does not drink alcohol and does not use drugs. She completed her Systems Review which is documented below. Any positive systems not related to Gyn are recommended to discuss with her PCP. Review of Systems   Constitutional: Negative. Negative for unexpected weight change. HENT: Positive for hearing loss. Eyes: Negative. Respiratory: Negative. Negative for cough and shortness of breath. Cardiovascular: Positive for leg swelling. Negative for chest pain and palpitations. Gastrointestinal: Negative. Negative for abdominal pain and blood in stool. Endocrine: Negative. Genitourinary: Negative. Negative for difficulty urinating, dysuria, menstrual problem, pelvic pain and urgency. Musculoskeletal: Negative. Skin: Negative. Allergic/Immunologic: Negative. Neurological: Positive for headaches. Hematological: Negative. Psychiatric/Behavioral: Negative. Negative for behavioral problems and confusion. All other systems reviewed and are negative. No results found for this visit on 06/29/22.    Blood pressure (!) 140/80, height 5' 4.5\" (1.638 m), weight 266 lb (120.7 kg). Body mass index is 44.95 kg/m². Wt Readings from Last 3 Encounters:   06/29/22 266 lb (120.7 kg)   06/22/22 264 lb (119.7 kg)   06/08/22 259 lb 9.6 oz (117.8 kg)      Physical Exam  Vitals reviewed. Exam conducted with a chaperone present. Constitutional:       General: She is not in acute distress. Appearance: Normal appearance. HENT:      Head: Normocephalic and atraumatic. Eyes:      Extraocular Movements: Extraocular movements intact. Pupils: Pupils are equal, round, and reactive to light. Pulmonary:      Effort: Pulmonary effort is normal. No respiratory distress. Chest:   Breasts: Breasts are symmetrical.      Right: Normal. No bleeding, inverted nipple, mass, nipple discharge, skin change or axillary adenopathy. Left: Normal. No bleeding, inverted nipple, mass, nipple discharge, skin change or axillary adenopathy. Abdominal:      General: Abdomen is flat. Bowel sounds are normal. There is no distension. Palpations: Abdomen is soft. There is no mass. Tenderness: There is no abdominal tenderness. There is no guarding or rebound. Hernia: No hernia is present. Genitourinary:     General: Normal vulva. Labia:         Right: No rash, tenderness or lesion. Left: No rash, tenderness or lesion. Vagina: Normal. No prolapsed vaginal walls. Adnexa: Right adnexa normal and left adnexa normal.        Right: No tenderness. Left: No tenderness. Rectum: Normal. Guaiac result negative. Comments: Uterus / cervix surgically absent  No rectocele. Musculoskeletal:      Cervical back: Normal range of motion and neck supple. Lymphadenopathy:      Upper Body:      Right upper body: No axillary adenopathy. Left upper body: No axillary adenopathy. Skin:     General: Skin is warm and dry. Neurological:      General: No focal deficit present.       Mental Status: She is alert and oriented to person, place, and time. Mental status is at baseline. Psychiatric:         Mood and Affect: Mood normal.         Behavior: Behavior normal.         Thought Content: Thought content normal.         Judgment: Judgment normal.          Assessment/plan: Suma was seen today for annual exam.    Diagnoses and all orders for this visit:    Well woman exam    Screening mammogram for breast cancer  -     [facility] DIGITAL SCREEN BILATERAL; Future     rec fluid / dietary fiber / fruit / miralax prn    Return in about 1 year (around 6/29/2023) for Annual, Breast Check.

## 2022-07-13 ENCOUNTER — HOSPITAL ENCOUNTER (OUTPATIENT)
Dept: WOUND CARE | Age: 52
Setting detail: RECURRING SERIES
Discharge: HOME | End: 2022-07-13
Payer: COMMERCIAL

## 2022-07-13 VITALS
BODY MASS INDEX: 43.78 KG/M2 | HEIGHT: 65 IN | HEART RATE: 79 BPM | RESPIRATION RATE: 18 BRPM | DIASTOLIC BLOOD PRESSURE: 76 MMHG | TEMPERATURE: 98.6 F | WEIGHT: 262.8 LBS | SYSTOLIC BLOOD PRESSURE: 96 MMHG

## 2022-07-13 DIAGNOSIS — L97.909 VENOUS ULCER (HCC): Primary | ICD-10-CM

## 2022-07-13 DIAGNOSIS — I83.009 VENOUS ULCER (HCC): Primary | ICD-10-CM

## 2022-07-13 PROCEDURE — 99212 OFFICE O/P EST SF 10 MIN: CPT | Performed by: SURGERY

## 2022-07-13 PROCEDURE — 99212 OFFICE O/P EST SF 10 MIN: CPT

## 2022-07-13 RX ORDER — CLOBETASOL PROPIONATE 0.5 MG/G
OINTMENT TOPICAL ONCE
Status: CANCELLED | OUTPATIENT
Start: 2022-07-13 | End: 2022-07-13

## 2022-07-13 RX ORDER — LIDOCAINE 40 MG/G
CREAM TOPICAL ONCE
Status: CANCELLED | OUTPATIENT
Start: 2022-07-13 | End: 2022-07-13

## 2022-07-13 RX ORDER — LIDOCAINE HYDROCHLORIDE 40 MG/ML
SOLUTION TOPICAL ONCE
Status: CANCELLED | OUTPATIENT
Start: 2022-07-13 | End: 2022-07-13

## 2022-07-13 RX ORDER — BETAMETHASONE DIPROPIONATE 0.05 %
OINTMENT (GRAM) TOPICAL ONCE
Status: CANCELLED | OUTPATIENT
Start: 2022-07-13 | End: 2022-07-13

## 2022-07-13 RX ORDER — LIDOCAINE HYDROCHLORIDE 20 MG/ML
JELLY TOPICAL ONCE
Status: CANCELLED | OUTPATIENT
Start: 2022-07-13 | End: 2022-07-13

## 2022-07-13 RX ORDER — BACITRACIN, NEOMYCIN, POLYMYXIN B 400; 3.5; 5 [USP'U]/G; MG/G; [USP'U]/G
OINTMENT TOPICAL ONCE
Status: CANCELLED | OUTPATIENT
Start: 2022-07-13 | End: 2022-07-13

## 2022-07-13 RX ORDER — GENTAMICIN SULFATE 1 MG/G
OINTMENT TOPICAL ONCE
Status: CANCELLED | OUTPATIENT
Start: 2022-07-13 | End: 2022-07-13

## 2022-07-13 RX ORDER — BACITRACIN ZINC AND POLYMYXIN B SULFATE 500; 1000 [USP'U]/G; [USP'U]/G
OINTMENT TOPICAL ONCE
Status: CANCELLED | OUTPATIENT
Start: 2022-07-13 | End: 2022-07-13

## 2022-07-13 RX ORDER — GINSENG 100 MG
CAPSULE ORAL ONCE
Status: CANCELLED | OUTPATIENT
Start: 2022-07-13 | End: 2022-07-13

## 2022-07-13 RX ORDER — LIDOCAINE 50 MG/G
OINTMENT TOPICAL ONCE
Status: CANCELLED | OUTPATIENT
Start: 2022-07-13 | End: 2022-07-13

## 2022-07-13 NOTE — FLOWSHEET NOTE
07/13/22 1329   Left Leg Edema Point of Measurement   Leg circumference 46.5 cm   Ankle circumference 26.5 cm   Foot circumference 23 cm   Compression Therapy Compression stockings   Wound 04/07/22 Pretibial Left;Distal;Medial #1   Date First Assessed/Time First Assessed: 04/07/22 0926   Present on Hospital Admission: Yes  Primary Wound Type: Venous Ulcer  Location: Pretibial  Wound Location Orientation: Left;Distal;Medial  Wound Description (Comments): #1   Wound Image    Wound Etiology Venous   Dressing Status Intact   Wound Cleansed Cleansed with saline   Dressing/Treatment Collagen; Adhesive bandage   Wound Length (cm) 0 cm   Wound Width (cm) 0 cm   Wound Depth (cm) 0 cm   Wound Surface Area (cm^2) 0 cm^2   Change in Wound Size % (l*w) 100   Wound Volume (cm^3) 0 cm^3   Wound Healing % 100   Wound Assessment Epithelialization   Drainage Amount None   Odor None   Sandra-wound Assessment Intact

## 2022-07-13 NOTE — WOUND CARE
Discharge Instructions for  Sammie Bonner  [address] [facility] [address]  [address] [facility], [address]   Phone [phone]   Fax [phone]      NAME:  Dedra Talbot  YOB: 1970  MEDICAL RECORD NUMBER:  [id number]  DATE:  7/13/2022    Return Appointment:   Discharge from wound center at this time. Instructions: Left lower leg wound:  Wound is healed! Patient may shower as previously.   Wear bilateral below knee compression stockings daily; place on first in morning and may remove at night for showering and sleeping. Moisturize legs daily. Discharge from wound center at this time.         Should you experience increased redness, swelling, pain, foul odor, size of wound(s), or have a temperature over 101 degrees please contact the [address] [place] [address] Road at [phone] or if after hours contact your primary care physician or go to the hospital emergency department. PLEASE NOTE: IF YOU ARE UNABLE TO OBTAIN WOUND SUPPLIES, CONTINUE TO USE THE SUPPLIES YOU HAVE AVAILABLE UNTIL YOU ARE ABLE TO REACH US. IT IS MOST IMPORTANT TO KEEP THE WOUND COVERED AT ALL TIMES.     Electronically signed Carlos Parra PT, [address] [facility] on 7/13/2022 at 1:49 PM

## 2022-07-13 NOTE — PROGRESS NOTES
[address]   Progress Note     [place]  MEDICAL RECORD NUMBER:  [id number]  AGE: 50 y.o. GENDER: female  : 1970  EPISODE DATE:  2022    Subjective:     Chief Complaint   Patient presents with    Wound Check     Left distal medial lower leg wound         Has been tolerating wound care dressings without problems. Assessment:     Venous ulcer left lower extremity, healed    Problem List Items Addressed This Visit        Other    Venous ulcer ([address]          Wound evaluation: Venous ulcer medial left lower extremity has healed, intact skin, no redness. Patient has moderate risk for morbidity and mortality due to conditions affecting wound healing discussed or addressed today: Venous insufficiency  Education and discussion held with patient regarding these disease processes especially issues related to the wound(s). Plan:   Discontinue dressing changes, continue using compression stocking, daily foot inspection, follow-up at wound center as needed. Wound 22 Pretibial Left;Distal;Medial #1 (Active)   Wound Image   22 1329   Wound Etiology Venous 22 1329   Dressing Status Intact 22 1329   Wound Cleansed Cleansed with saline 22 1329   Dressing/Treatment Collagen; Adhesive bandage 22 1329   Wound Length (cm) 0 cm 22 1329   Wound Width (cm) 0 cm 22 1329   Wound Depth (cm) 0 cm 22 1329   Wound Surface Area (cm^2) 0 cm^2 22 1329   Change in Wound Size % (l*w) 100 22 1329   Wound Volume (cm^3) 0 cm^3 22 1329   Wound Healing % 100 22 1329   Wound Assessment Epithelialization 22 1329   Drainage Amount None 22 1329   Drainage Description Clear 22 1029   Odor None 22 1329   Sandra-wound Assessment Intact 22 1329   Margins Attached edges; Defined edges 22 1029   Wound Thickness Description not for Pressure Injury Full thickness 22 1029   Number of days: 97 1. Wound care: Any procedure as below (debridement, skin substitute application, biopsy, total contact casting, chemical cauterization). Please see attached Discharge Instructions for specific wound treatment plan  2. Offloading: Avoid pressure and trauma to wound N/A no pressure relief needed for this patient  3. Edema control: knee-high gradient compression stockings  4. Infection: no signs of acute infection. Continue to monitor. 5. Circulation: Available vascular imaging reviewed. N/A  6. Nutrition: stressed lean protein intake. 7. Most recent pertinent imaging and labs reviewed: N/A  8. Case discussed with other providers involved in the patient's care: N/A      HISTORY of PRESENT ILLNESS HPI     Suma Christie is a 50 y.o. female who presents today for wound/ulcer evaluation. History of Wound Context: Per original history and physical on this patient. Changes in history since last exam and/or wound center visit: Patient has been receiving dressing changes with collagen and compression stocking to help control swelling. On today's visit she is without complaints. She denies any pain or discomfort nor has she noted any redness or drainage. She is taking protein supplements. No changes in overall health since last visit. Objective:    BP 96/76   Pulse 79   Temp 98.6 °F (37 °C) (Oral)   Resp 18   Ht 5' 4.5\" (1.638 m)   Wt 262 lb 12.8 oz (119.2 kg)   BMI 44.41 kg/m²   Wt Readings from Last 3 Encounters:   07/13/22 262 lb 12.8 oz (119.2 kg)   06/29/22 266 lb (120.7 kg)   06/22/22 264 lb (119.7 kg)       PHYSICAL EXAM  General: alert and oriented to person, place and time, well-developed and well nourished, and in no acute distress. Skin: warm and dry, no rash. Head: normocephalic and atraumatic. Eyes: extraocular eye movements intact, conjunctivae normal, and sclera anicteric. ENT: hearing grossly normal bilaterally. Normal appearance. Respiratory: no chest wall tenderness.  no respiratory distress. GI: abdomen soft, non-tender and non-distended, benign. Musculoskeletal: normal range of motion of joints. Nontender calves. No cyanosis. Edema trace. Neurologic: Speech normal. No focal deficits.  Mental status normal.     PAST MEDICAL HISTORY        Diagnosis Date    Abnormal Papanicolaou smear of cervix     Cough 8/17/2016    Gilbert's syndrome     elevated bilirubin levels    Hard of hearing     Headache     HPV (human papilloma virus) infection     Hypersomnia     Hypertension     Pt denies, states family hx only    Leg ulcer, left (HCC)     Ankle- treated by wound clinic     Morbid obesity ([facility]     Narcolepsy without cataplexy(347.00) 08/17/2016    not taking medication    Nausea & vomiting     Otitis media with effusion 8/17/2016       PAST SURGICAL HISTORY    Past Surgical History:   Procedure Laterality Date    CHOLECYSTECTOMY      COLONOSCOPY N/A 06/08/2022    COLORECTAL CANCER SCREENING, NOT HIGH RISK/43 performed by Berenice Carias MD at [address]  05/2020    [address] / tubes    LEEP  2000    OTHER SURGICAL HISTORY  1990    cyst on tailbone       FAMILY HISTORY    Family History   Problem Relation Age of Onset    Colon Cancer Neg Hx     Breast Cancer Other         MOTHER'S AUNT    Breast Cancer Other         MOTHERS COUSIN    Diabetes Paternal Grandfather     Thyroid Disease Brother     Other Mother         Gilbert's syndrome    Cancer Maternal Grandmother         cancer of the uretha    Stroke Father     Heart Disease Father     Thyroid Disease Father     Diabetes Father     Ovarian Cancer Neg Hx        SOCIAL HISTORY    Social History     Tobacco Use    Smoking status: Never Smoker    Smokeless tobacco: Never Used   Vaping Use    Vaping Use: Never used   Substance Use Topics    Alcohol use: No    Drug use: No       ALLERGIES    Allergies   Allergen Reactions    Latex Rash    Penicillins Other (See Comments)     Other reaction(s): Unknown-Unspecified  Unknown reaction. Patient was a child when reaction occurred.  Sulfa Antibiotics Itching    Lavender Oil Itching and Rash       MEDICATIONS    Current Outpatient Medications on File Prior to Encounter   Medication Sig Dispense Refill    COLLAGEN PO Take by mouth      Omega-3 Fatty Acids (FISH OIL) 1000 MG CAPS Take 1 capsule by mouth daily      ELDERBERRY PO Take 1 tablet by mouth daily      vitamin D3 (CHOLECALCIFEROL) 10 MCG (400 UNIT) TABS tablet Take 400 Units by mouth daily      cyanocobalamin 500 MCG tablet Take 500 mcg by mouth daily       No current facility-administered medications on file prior to encounter. REVIEW OF SYSTEMS  A comprehensive review of systems was negative except for: None    Written patient dismissal instructions given to patient and signed by patient or POA. Thank you very much for the opportunity to participate in this patient's care. Electronically signed by Aleyda Warren MD on 7/13/22 at 1:56 PM EDT     TIME:  If total time for today's E/M service is used for level of service it is documented below.     This time includes physician non-face-to-face service time visit on the date of service and includes    Preparing to see the patient (eg, review of tests)  Obtaining and/or reviewing separately obtained history  Performing a medically necessary appropriate examination and/or evaluation  Counseling and educating the patient/family/caregiver  Ordering medications, tests, or procedures  Referring and communicating with other health care professionals as needed  Documenting clinical information in the electronic or other health record  Independently interpreting results (not reported separately) and communicating results to the patient/family/caregiver  Care coordination (not reported separately)    E/M time = 15 Minutes

## 2022-07-15 ENCOUNTER — PATIENT MESSAGE (OUTPATIENT)
Dept: FAMILY MEDICINE CLINIC | Facility: CLINIC | Age: 52
End: 2022-07-15

## 2022-07-18 ENCOUNTER — TELEPHONE (OUTPATIENT)
Dept: FAMILY MEDICINE CLINIC | Facility: CLINIC | Age: 52
End: 2022-07-18

## 2022-07-18 DIAGNOSIS — H91.90 HEARING LOSS, UNSPECIFIED HEARING LOSS TYPE, UNSPECIFIED LATERALITY: Primary | ICD-10-CM

## 2022-07-22 ENCOUNTER — OFFICE VISIT (OUTPATIENT)
Dept: ENT CLINIC | Age: 52
End: 2022-07-22
Payer: COMMERCIAL

## 2022-07-22 ENCOUNTER — HOSPITAL ENCOUNTER (OUTPATIENT)
Dept: MAMMOGRAPHY | Age: 52
Discharge: HOME | End: 2022-07-25
Payer: COMMERCIAL

## 2022-07-22 VITALS
DIASTOLIC BLOOD PRESSURE: 98 MMHG | SYSTOLIC BLOOD PRESSURE: 130 MMHG | BODY MASS INDEX: 45.48 KG/M2 | HEIGHT: 64 IN | WEIGHT: 266.4 LBS

## 2022-07-22 DIAGNOSIS — Z12.31 SCREENING MAMMOGRAM FOR BREAST CANCER: ICD-10-CM

## 2022-07-22 DIAGNOSIS — H90.3 SENSORINEURAL HEARING LOSS, ASYMMETRICAL: Primary | ICD-10-CM

## 2022-07-22 PROCEDURE — 99203 OFFICE O/P NEW LOW 30 MIN: CPT | Performed by: PHYSICIAN ASSISTANT

## 2022-07-22 PROCEDURE — 92557 COMPREHENSIVE HEARING TEST: CPT | Performed by: PHYSICIAN ASSISTANT

## 2022-07-22 PROCEDURE — 77063 BREAST TOMOSYNTHESIS BI: CPT

## 2022-07-22 PROCEDURE — 92567 TYMPANOMETRY: CPT | Performed by: PHYSICIAN ASSISTANT

## 2022-07-22 ASSESSMENT — ENCOUNTER SYMPTOMS
COUGH: 0
ABDOMINAL PAIN: 0
EYE DISCHARGE: 0

## 2022-07-22 NOTE — PROGRESS NOTES
Chief Complaint   Patient presents with    New Patient    Hearing Problem     Patient here for hearing loss. HPI:  Alison Graham is a 50 y.o. female seen for evaluation of hearing loss and left tinnitus. She wears hearing aids. She reports that she has a ticking sound which is intermittent in the left ear. She denies hearing change when the sound started. Patient denies dizziness with the sound. She denies any trauma to the ear. Patient reports that she purchased her hearing aids in 2019. She reports a hearing test in 07/2021 and she was told that she did not have a significant change when compared to the prior hearing test. She states that she saw a different audiologist and they tested her hearing in June of this year and compared to the testing from 2019, there was a significant change in the left ear. Patient was told that they did not have the copy of audiogram from 2021 when they tried to find it for comparison. Patient denies any prior ear surgery. She reports a family history of hearing loss. Past Medical History, Past Surgical History, Family history, Social History, and Medications were all reviewed with the patient today and updated as necessary. Allergies   Allergen Reactions    Latex Rash    Penicillins Other (See Comments)     Other reaction(s): Unknown-Unspecified  Unknown reaction. Patient was a child when reaction occurred.       Sulfa Antibiotics Itching    Lavender Oil Itching and Rash     Patient Active Problem List   Diagnosis    Otitis media with effusion    Cough    Intramural leiomyoma of uterus    S/P hysterectomy    Obesity, morbid (HCC)    Venous ulcer (HCC)     Current Outpatient Medications   Medication Sig    COLLAGEN PO Take by mouth    Omega-3 Fatty Acids (FISH OIL) 1000 MG CAPS Take 1 capsule by mouth daily    ELDERBERRY PO Take 1 tablet by mouth daily    vitamin D3 (CHOLECALCIFEROL) 10 MCG (400 UNIT) TABS tablet Take 400 Units by mouth daily    cyanocobalamin 500 MCG tablet Take 500 mcg by mouth daily     No current facility-administered medications for this visit. Past Medical History:   Diagnosis Date    Abnormal Papanicolaou smear of cervix     Cough 8/17/2016    Gilbert's syndrome     elevated bilirubin levels    Hard of hearing     Headache     HPV (human papilloma virus) infection     Hypersomnia     Hypertension     Pt denies, states family hx only    Leg ulcer, left ([address]     Ankle- treated by wound clinic     Morbid obesity ([facility] [address]     Narcolepsy without cataplexy(347.00) 08/17/2016    not taking medication    Nausea & vomiting     Otitis media with effusion 8/17/2016     Social History     Tobacco Use    Smoking status: Never    Smokeless tobacco: Never   Substance Use Topics    Alcohol use: No     Past Surgical History:   Procedure Laterality Date    CHOLECYSTECTOMY      COLONOSCOPY N/A 06/08/2022    COLORECTAL CANCER SCREENING, NOT HIGH RISK/43 performed by Clayton Khanna MD at [facility]  05/2020    [address] / tubes    LEEP  2000    OTHER SURGICAL HISTORY  1990    cyst on tailbone     Family History   Problem Relation Age of Onset    Colon Cancer Neg Hx     Breast Cancer Other         MOTHER'S AUNT    Breast Cancer Other         MOTHERS COUSIN    Diabetes Paternal Grandfather     Thyroid Disease Brother     Other Mother         Gilbert's syndrome    Cancer Maternal Grandmother         cancer of the uretha    Stroke Father     Heart Disease Father     Thyroid Disease Father     Diabetes Father     Ovarian Cancer Neg Hx         ROS:    Review of Systems   Constitutional:  Negative for fever. HENT:  Positive for hearing loss. Negative for ear pain. Eyes:  Negative for discharge. Respiratory:  Negative for cough. Cardiovascular:  Negative for chest pain. Gastrointestinal:  Negative for abdominal pain. Musculoskeletal:  Negative for neck pain. Skin:  Negative for rash.    Neurological: Negative for dizziness. Hematological:  Negative for adenopathy. PHYSICAL EXAM:    BP (!) 130/98 (Site: Left Upper Arm, Position: Sitting)   Ht 5' 4\" (1.626 m)   Wt 266 lb 6.4 oz (120.8 kg)   BMI 45.73 kg/m²     Head  Head and Face - The head and face are atraumatic, normocephalic. The salivary glands are intact and the facial appearance is symmetric. Head shape - No scars, lesions, or masses    Ear  Ear - Tympanic membranes are clear, the external auditory canal is without discharge and the tympanic membranes are mobile. There is no tympanic membrane erythema and no middle ear opacity is visualized. Pinna: bilateral - No hematomas or lacerations    Eye  Eyeball - bilateral - extraocular motions intact, equal in size and movement    Nose and Sinuses  Nose - mucosa is pink and the septum is midline. There are no nasal lesions and there was no turbinate hypertrophy. Mouth and Throat  Lips - upper lip - normal: no dryness, cracking, pallor, cyanosis, or vesicular eruption. Lower lip: normal: no dryness, cracking, pallor, cyanosis, or vesicular eruption. Teeth and Gums - No bleeding, no inflammation or ulceration. Lips - Pink and symmetrical  Oral Cavity - Oral mucosa pink, soft and hard palates contiguous and tongue moist without ulcers. The mucosa is without ulcerations. No oral cavity masses present. Parotid Gland - Bilateral - Non tender, not swollen. Oropharynx - No discharge or Erythema  Nasopharynx - Non obstructed, mucosa pink and moist.    Hypopharynx - No erythema  Submandibular Gland - Non tender, not swollen. Tonsils - Normal    Neck   Neck - Full range of motion and Supple. Non Tender. No Masses. Trachea - Midline. Thyroid - Gland - Symmetric. Non Tender. Nodules - No nodules.     Neurologic - II - XII Grossly intact bilaterally    Cardiac  Inspection - Jugular Vein:  Bilateral - non distended, no prominent pulsations    Chest and Lung  Inspection - Movements: Chest symmetrical with bilateral expansion, respirations even and non labored      ASSESSMENT and PLAN      ICD-10-CM    1. Sensorineural hearing loss, asymmetrical  H90.3 COMPREHENSIVE HEARING TEST     TYMPANOMETRY     MRI IAC POSTERIOR FOSSA W WO CONTRAST          Audiogram shows normal sloping to severe SNHL of the right ear and normal to profound SNHL of the left ear. Speech discrimination is    52% on the right and 24% on the left. Tympanograms are type A bilaterally. There is a significant asymmetry on the left and I recommended further evaluation with MRI IAC to rule out retrocochlear pathology. Patient is in agreement. Thank you for the opportunity to participate in the care of this patient. Please let me know if you have any further questions or concerns.     Osvaldo Whitley PA-C  7/22/2022

## 2022-07-25 ENCOUNTER — CLINICAL DOCUMENTATION (OUTPATIENT)
Dept: ENT CLINIC | Age: 52
End: 2022-07-25

## 2022-08-07 DIAGNOSIS — H90.3 SENSORINEURAL HEARING LOSS, ASYMMETRICAL: ICD-10-CM

## 2022-08-09 ENCOUNTER — TELEPHONE (OUTPATIENT)
Dept: ENT CLINIC | Age: 52
End: 2022-08-09

## 2022-08-16 ENCOUNTER — TELEPHONE (OUTPATIENT)
Dept: ENT CLINIC | Age: 52
End: 2022-08-16

## 2022-08-16 NOTE — TELEPHONE ENCOUNTER
Patient contacted to review MRI results. There was no IAC abnormality noted and impression was negative brain MRI with an without contrast. I recommended that patient follow up with her audiologist for yearly audiograms to monitor her hearing. She can call the clinic if needed. Patient verbalized understanding.

## 2023-04-03 ENCOUNTER — NURSE ONLY (OUTPATIENT)
Dept: FAMILY MEDICINE CLINIC | Facility: CLINIC | Age: 53
End: 2023-04-03
Payer: COMMERCIAL

## 2023-04-03 DIAGNOSIS — Z11.59 NEED FOR HEPATITIS C SCREENING TEST: ICD-10-CM

## 2023-04-03 DIAGNOSIS — Z00.00 LABORATORY EXAM ORDERED AS PART OF ROUTINE GENERAL MEDICAL EXAMINATION: Primary | ICD-10-CM

## 2023-04-03 DIAGNOSIS — E55.9 VITAMIN D DEFICIENCY: ICD-10-CM

## 2023-04-03 LAB
BILIRUBIN, URINE, POC: NEGATIVE
BLOOD URINE, POC: NEGATIVE
GLUCOSE URINE, POC: NEGATIVE
GRANS ABSOLUTE, POC: 2.8 K/UL
GRANULOCYTES %, POC: 45.5 %
HEMATOCRIT, POC: 43 %
HEMOGLOBIN, POC: 13.9 G/DL
KETONES, URINE, POC: NEGATIVE
LEUKOCYTE ESTERASE, URINE, POC: NEGATIVE
LYMPHOCYTE %, POC: 44.8 %
LYMPHS ABSOLUTE, POC: 2.8 K/UL
MCH, POC: NORMAL PG (ref 40–?)
MCHC, POC: 32.3
MCV, POC: 93.9
MONOCYTE %, POC: 9.7 %
MONOCYTE, ABSOLUTE POC: 0.6 K/UL
MPV, POC: 7.8 FL
NITRITE, URINE, POC: NEGATIVE
PH, URINE, POC: 6 (ref 4.6–8)
PLATELET COUNT, POC: 347 K/UL
PROTEIN,URINE, POC: NEGATIVE
RBC, POC: 4.58 M/UL
RDW, POC: 14.3 %
SPECIFIC GRAVITY, URINE, POC: 1.02 (ref 1–1.03)
URINALYSIS CLARITY, POC: CLEAR
URINALYSIS COLOR, POC: YELLOW
UROBILINOGEN, POC: NORMAL
WBC, POC: 6.2 K/UL

## 2023-04-03 PROCEDURE — 85025 COMPLETE CBC W/AUTO DIFF WBC: CPT | Performed by: FAMILY MEDICINE

## 2023-04-03 PROCEDURE — 36415 COLL VENOUS BLD VENIPUNCTURE: CPT | Performed by: FAMILY MEDICINE

## 2023-04-03 PROCEDURE — 81002 URINALYSIS NONAUTO W/O SCOPE: CPT | Performed by: FAMILY MEDICINE

## 2023-04-04 LAB
25(OH)D3 SERPL-MCNC: 49.7 NG/ML (ref 30–100)
ALBUMIN SERPL-MCNC: 4 G/DL (ref 3.5–5)
ALBUMIN/GLOB SERPL: 1.2 (ref 0.4–1.6)
ALP SERPL-CCNC: 50 U/L (ref 50–136)
ALT SERPL-CCNC: 31 U/L (ref 12–65)
ANION GAP SERPL CALC-SCNC: 5 MMOL/L (ref 2–11)
AST SERPL-CCNC: 15 U/L (ref 15–37)
BILIRUB SERPL-MCNC: 1.1 MG/DL (ref 0.2–1.1)
BUN SERPL-MCNC: 10 MG/DL (ref 6–23)
CALCIUM SERPL-MCNC: 9.5 MG/DL (ref 8.3–10.4)
CHLORIDE SERPL-SCNC: 107 MMOL/L (ref 101–110)
CHOLEST SERPL-MCNC: 200 MG/DL
CO2 SERPL-SCNC: 26 MMOL/L (ref 21–32)
CREAT SERPL-MCNC: 0.9 MG/DL (ref 0.6–1)
GLOBULIN SER CALC-MCNC: 3.4 G/DL (ref 2.8–4.5)
GLUCOSE SERPL-MCNC: 105 MG/DL (ref 65–100)
HCV AB SER QL: NONREACTIVE
HDLC SERPL-MCNC: 81 MG/DL (ref 40–60)
HDLC SERPL: 2.5
HIV 1+2 AB+HIV1 P24 AG SERPL QL IA: NONREACTIVE
HIV 1/2 RESULT COMMENT: NORMAL
LDLC SERPL CALC-MCNC: 104.6 MG/DL
POTASSIUM SERPL-SCNC: 3.8 MMOL/L (ref 3.5–5.1)
PROT SERPL-MCNC: 7.4 G/DL (ref 6.3–8.2)
SODIUM SERPL-SCNC: 138 MMOL/L (ref 133–143)
TRIGL SERPL-MCNC: 72 MG/DL (ref 35–150)
TSH, 3RD GENERATION: 2.97 UIU/ML (ref 0.36–3.74)
VLDLC SERPL CALC-MCNC: 14.4 MG/DL (ref 6–23)

## 2023-06-30 ENCOUNTER — TRANSCRIBE ORDERS (OUTPATIENT)
Dept: SCHEDULING | Age: 53
End: 2023-06-30

## 2023-06-30 DIAGNOSIS — Z12.31 SCREENING MAMMOGRAM FOR HIGH-RISK PATIENT: Primary | ICD-10-CM

## 2023-07-06 ENCOUNTER — OFFICE VISIT (OUTPATIENT)
Dept: OBGYN CLINIC | Age: 53
End: 2023-07-06
Payer: COMMERCIAL

## 2023-07-06 VITALS — WEIGHT: 272 LBS | BODY MASS INDEX: 46.69 KG/M2 | DIASTOLIC BLOOD PRESSURE: 84 MMHG | SYSTOLIC BLOOD PRESSURE: 130 MMHG

## 2023-07-06 DIAGNOSIS — E66.01 OBESITY, MORBID (HCC): ICD-10-CM

## 2023-07-06 DIAGNOSIS — Z01.419 ENCOUNTER FOR GYNECOLOGICAL EXAMINATION: Primary | ICD-10-CM

## 2023-07-06 PROCEDURE — 99396 PREV VISIT EST AGE 40-64: CPT | Performed by: OBSTETRICS & GYNECOLOGY

## 2023-07-06 ASSESSMENT — ENCOUNTER SYMPTOMS
EYES NEGATIVE: 1
COUGH: 0
ALLERGIC/IMMUNOLOGIC NEGATIVE: 1
SHORTNESS OF BREATH: 0
GASTROINTESTINAL NEGATIVE: 1
BLOOD IN STOOL: 0
RESPIRATORY NEGATIVE: 1
ABDOMINAL PAIN: 0

## 2023-07-06 NOTE — PROGRESS NOTES
focal deficit present. Mental Status: She is alert and oriented to person, place, and time. Psychiatric:         Mood and Affect: Mood normal.         Behavior: Behavior normal.         Thought Content: Thought content normal.         Judgment: Judgment normal.        Assessment/plan: Iris \"Altagracia Soares\" was seen today for Annual Exam (Pt concerned about extra lump of tissue under R axilla, uneven from L. Non painful.  )       Iris was seen today for annual exam.    Diagnoses and all orders for this visit:    Encounter for gynecological examination - no breast masses felt, has MGM in 2 wks. Probably related to weight gain. Obesity, morbid ([address]) - diet, exercise and weight loss discussed.          Return in about 1 year (around 7/6/2024) for Annual.

## 2023-07-24 ENCOUNTER — HOSPITAL ENCOUNTER (OUTPATIENT)
Dept: MAMMOGRAPHY | Age: 53
Discharge: HOME | End: 2023-07-27
Attending: OBSTETRICS & GYNECOLOGY
Payer: COMMERCIAL

## 2023-07-24 DIAGNOSIS — Z12.31 SCREENING MAMMOGRAM FOR HIGH-RISK PATIENT: ICD-10-CM

## 2023-07-24 PROCEDURE — 77063 BREAST TOMOSYNTHESIS BI: CPT

## 2024-05-28 ENCOUNTER — NURSE ONLY (OUTPATIENT)
Dept: FAMILY MEDICINE CLINIC | Facility: CLINIC | Age: 54
End: 2024-05-28

## 2024-05-28 DIAGNOSIS — E55.9 VITAMIN D DEFICIENCY: Primary | ICD-10-CM

## 2024-05-28 DIAGNOSIS — Z00.00 LABORATORY EXAM ORDERED AS PART OF ROUTINE GENERAL MEDICAL EXAMINATION: ICD-10-CM

## 2024-05-28 LAB
25(OH)D3 SERPL-MCNC: 41.7 NG/ML (ref 30–100)
ALBUMIN SERPL-MCNC: 4 G/DL (ref 3.5–5)
ALBUMIN/GLOB SERPL: 1.3 (ref 1–1.9)
ALP SERPL-CCNC: 58 U/L (ref 35–104)
ALT SERPL-CCNC: 28 U/L (ref 12–65)
ANION GAP SERPL CALC-SCNC: 12 MMOL/L (ref 9–18)
AST SERPL-CCNC: 25 U/L (ref 15–37)
BASOPHILS # BLD: 0 K/UL (ref 0–0.2)
BASOPHILS NFR BLD: 1 % (ref 0–2)
BILIRUB SERPL-MCNC: 0.8 MG/DL (ref 0–1.2)
BUN SERPL-MCNC: 11 MG/DL (ref 6–23)
CALCIUM SERPL-MCNC: 9.6 MG/DL (ref 8.8–10.2)
CHLORIDE SERPL-SCNC: 100 MMOL/L (ref 98–107)
CHOLEST SERPL-MCNC: 198 MG/DL (ref 0–200)
CO2 SERPL-SCNC: 28 MMOL/L (ref 20–28)
CREAT SERPL-MCNC: 0.78 MG/DL (ref 0.6–1.1)
DIFFERENTIAL METHOD BLD: NORMAL
EOSINOPHIL # BLD: 0.2 K/UL (ref 0–0.8)
EOSINOPHIL NFR BLD: 3 % (ref 0.5–7.8)
ERYTHROCYTE [DISTWIDTH] IN BLOOD BY AUTOMATED COUNT: 14.1 % (ref 11.9–14.6)
GLOBULIN SER CALC-MCNC: 3.2 G/DL (ref 2.3–3.5)
GLUCOSE SERPL-MCNC: 107 MG/DL (ref 70–99)
HCT VFR BLD AUTO: 42.2 % (ref 35.8–46.3)
HDLC SERPL-MCNC: 71 MG/DL (ref 40–60)
HDLC SERPL: 2.8 (ref 0–5)
IMM GRANULOCYTES # BLD AUTO: 0 K/UL (ref 0–0.5)
IMM GRANULOCYTES NFR BLD AUTO: 0 % (ref 0–5)
LDLC SERPL CALC-MCNC: 112 MG/DL (ref 0–100)
LYMPHOCYTES # BLD: 2.5 K/UL (ref 0.5–4.6)
LYMPHOCYTES NFR BLD: 39 % (ref 13–44)
MCH RBC QN AUTO: 29 PG (ref 26.1–32.9)
MCHC RBC AUTO-ENTMCNC: 32.5 G/DL (ref 31.4–35)
MCV RBC AUTO: 89.2 FL (ref 82–102)
MONOCYTES # BLD: 0.5 K/UL (ref 0.1–1.3)
MONOCYTES NFR BLD: 7 % (ref 4–12)
NEUTS SEG # BLD: 3.1 K/UL (ref 1.7–8.2)
NEUTS SEG NFR BLD: 50 % (ref 43–78)
NRBC # BLD: 0 K/UL (ref 0–0.2)
PLATELET # BLD AUTO: 277 K/UL (ref 150–450)
PMV BLD AUTO: 10 FL (ref 9.4–12.3)
POTASSIUM SERPL-SCNC: 4.2 MMOL/L (ref 3.5–5.1)
PROT SERPL-MCNC: 7.2 G/DL (ref 6.3–8.2)
RBC # BLD AUTO: 4.73 M/UL (ref 4.05–5.2)
SODIUM SERPL-SCNC: 139 MMOL/L (ref 136–145)
TSH, 3RD GENERATION: 2.33 UIU/ML (ref 0.27–4.2)
VLDLC SERPL CALC-MCNC: 14 MG/DL (ref 6–23)
WBC # BLD AUTO: 6.3 K/UL (ref 4.3–11.1)

## 2024-05-29 LAB
APPEARANCE UR: CLEAR
BACTERIA URNS QL MICRO: 0 /HPF
BILIRUB UR QL: NEGATIVE
COLOR UR: NORMAL
GLUCOSE UR STRIP.AUTO-MCNC: NEGATIVE MG/DL
HGB UR QL STRIP: NEGATIVE
KETONES UR QL STRIP.AUTO: NEGATIVE MG/DL
LEUKOCYTE ESTERASE UR QL STRIP.AUTO: NEGATIVE
NITRITE UR QL STRIP.AUTO: NEGATIVE
PH UR STRIP: 6 (ref 5–9)
PROT UR STRIP-MCNC: NEGATIVE MG/DL
SP GR UR REFRACTOMETRY: 1.02 (ref 1–1.02)
UROBILINOGEN UR QL STRIP.AUTO: 0.2 EU/DL (ref 0.2–1)

## 2024-06-04 ASSESSMENT — PATIENT HEALTH QUESTIONNAIRE - PHQ9
SUM OF ALL RESPONSES TO PHQ QUESTIONS 1-9: 0
1. LITTLE INTEREST OR PLEASURE IN DOING THINGS: NOT AT ALL
2. FEELING DOWN, DEPRESSED OR HOPELESS: NOT AT ALL
SUM OF ALL RESPONSES TO PHQ QUESTIONS 1-9: 0
1. LITTLE INTEREST OR PLEASURE IN DOING THINGS: NOT AT ALL
SUM OF ALL RESPONSES TO PHQ QUESTIONS 1-9: 0
SUM OF ALL RESPONSES TO PHQ QUESTIONS 1-9: 0
SUM OF ALL RESPONSES TO PHQ9 QUESTIONS 1 & 2: 0
SUM OF ALL RESPONSES TO PHQ9 QUESTIONS 1 & 2: 0
2. FEELING DOWN, DEPRESSED OR HOPELESS: NOT AT ALL

## 2024-06-05 ENCOUNTER — OFFICE VISIT (OUTPATIENT)
Dept: FAMILY MEDICINE CLINIC | Facility: CLINIC | Age: 54
End: 2024-06-05
Payer: COMMERCIAL

## 2024-06-05 VITALS
SYSTOLIC BLOOD PRESSURE: 118 MMHG | DIASTOLIC BLOOD PRESSURE: 66 MMHG | HEIGHT: 64 IN | WEIGHT: 271 LBS | BODY MASS INDEX: 46.26 KG/M2 | HEART RATE: 95 BPM

## 2024-06-05 DIAGNOSIS — Z12.31 SCREENING MAMMOGRAM FOR HIGH-RISK PATIENT: ICD-10-CM

## 2024-06-05 DIAGNOSIS — Z13.31 SCREENING FOR DEPRESSION: ICD-10-CM

## 2024-06-05 DIAGNOSIS — Z00.00 ROUTINE GENERAL MEDICAL EXAMINATION AT A HEALTH CARE FACILITY: Primary | ICD-10-CM

## 2024-06-05 PROCEDURE — 99396 PREV VISIT EST AGE 40-64: CPT | Performed by: FAMILY MEDICINE

## 2024-06-05 SDOH — ECONOMIC STABILITY: HOUSING INSECURITY
IN THE LAST 12 MONTHS, WAS THERE A TIME WHEN YOU DID NOT HAVE A STEADY PLACE TO SLEEP OR SLEPT IN A SHELTER (INCLUDING NOW)?: NO

## 2024-06-05 SDOH — ECONOMIC STABILITY: FOOD INSECURITY: WITHIN THE PAST 12 MONTHS, YOU WORRIED THAT YOUR FOOD WOULD RUN OUT BEFORE YOU GOT MONEY TO BUY MORE.: NEVER TRUE

## 2024-06-05 SDOH — ECONOMIC STABILITY: FOOD INSECURITY: WITHIN THE PAST 12 MONTHS, THE FOOD YOU BOUGHT JUST DIDN'T LAST AND YOU DIDN'T HAVE MONEY TO GET MORE.: NEVER TRUE

## 2024-06-05 SDOH — ECONOMIC STABILITY: INCOME INSECURITY: HOW HARD IS IT FOR YOU TO PAY FOR THE VERY BASICS LIKE FOOD, HOUSING, MEDICAL CARE, AND HEATING?: NOT HARD AT ALL

## 2024-06-05 ASSESSMENT — ENCOUNTER SYMPTOMS
ABDOMINAL PAIN: 0
COUGH: 0
SHORTNESS OF BREATH: 0

## 2024-06-05 NOTE — PROGRESS NOTES
Vit D, 25-Hydroxy 41.7 30.0 - 100.0 ng/mL       ASSESSMENT and PLAN    Visit Diagnoses and Associated Orders       Routine general medical examination at a health care facility    -  Primary         Body mass index (BMI) 45.0-49.9, adult ([facility])             Screening for depression             Screening mammogram for high-risk patient        EDWIN DIGITAL SCREEN W OR WO CAD BILATERAL [95413 CPT(R)]   - Future Order         ORDERS WITHOUT AN ASSOCIATED DIAGNOSIS    Multiple Vitamins-Minerals (OCUVITE ADULT 50+ PO) [92376]                  Diagnosis Orders   1. Routine general medical examination at a health care facility        2. Body mass index (BMI) 45.0-49.9, adult ([facility])        3. Screening for depression        4. Screening mammogram for high-risk patient  EDWIN DIGITAL SCREEN W OR WO CAD BILATERAL      , Diagnoses and all orders for this visit:    Routine general medical examination at a health care facility    Body mass index (BMI) 45.0-49.9, adult ([facility])    Screening for depression    Screening mammogram for high-risk patient  -     EDWIN DIGITAL SCREEN W OR WO CAD BILATERAL; Future    , Otherwise normal routine physical exam recommended weight loss and will lengthy discussion about diet and exercise as well as pharmacological and nonpharmacological i.e. surgery options patient will think about it and contact us otherwise continue with diet exercise we will get her set up for screening mammogram and follow-up as needed.I have spent a total of 8-15 minutes assessing, reviewing, and discussing the depression screening with patient in office today.

## 2024-07-08 NOTE — PROGRESS NOTES
Adnexa: Right adnexa normal and left adnexa normal.        Right: No tenderness.          Left: No tenderness.        Comments: Uterus and cervix surgically absent  Musculoskeletal:         General: Normal range of motion.      Cervical back: Normal range of motion and neck supple.   Lymphadenopathy:      Upper Body:      Right upper body: No axillary adenopathy.      Left upper body: No axillary adenopathy.   Skin:     General: Skin is warm and dry.   Neurological:      General: No focal deficit present.      Mental Status: She is alert and oriented to person, place, and time.   Psychiatric:         Mood and Affect: Mood normal.         Behavior: Behavior normal.         Thought Content: Thought content normal.         Judgment: Judgment normal.          Assessment/plan: Iris \"Altagracia Soares\" was seen today for Annual Exam       Iris was seen today for annual exam.    Diagnoses and all orders for this visit:    Well woman exam with routine gynecological exam    Encounter for screening mammogram for malignant neoplasm of breast    Menopausal symptoms - declines intervention at present, however we did discuss herbal supplement and ERT r/b if needed - would recommend ERT patch if gets to that point         Return in about 1 year (around 7/9/2025) for Annual.

## 2024-07-09 ENCOUNTER — OFFICE VISIT (OUTPATIENT)
Dept: OBGYN CLINIC | Age: 54
End: 2024-07-09
Payer: COMMERCIAL

## 2024-07-09 VITALS
DIASTOLIC BLOOD PRESSURE: 80 MMHG | HEIGHT: 64 IN | WEIGHT: 265 LBS | BODY MASS INDEX: 45.24 KG/M2 | SYSTOLIC BLOOD PRESSURE: 134 MMHG

## 2024-07-09 DIAGNOSIS — N95.1 MENOPAUSAL SYMPTOMS: ICD-10-CM

## 2024-07-09 DIAGNOSIS — Z01.419 WELL WOMAN EXAM WITH ROUTINE GYNECOLOGICAL EXAM: Primary | ICD-10-CM

## 2024-07-09 DIAGNOSIS — Z12.31 ENCOUNTER FOR SCREENING MAMMOGRAM FOR MALIGNANT NEOPLASM OF BREAST: ICD-10-CM

## 2024-07-09 PROBLEM — D25.1 INTRAMURAL LEIOMYOMA OF UTERUS: Status: RESOLVED | Noted: 2020-05-12 | Resolved: 2024-07-09

## 2024-07-09 PROCEDURE — 99459 PELVIC EXAMINATION: CPT | Performed by: OBSTETRICS & GYNECOLOGY

## 2024-07-09 PROCEDURE — 99396 PREV VISIT EST AGE 40-64: CPT | Performed by: OBSTETRICS & GYNECOLOGY

## 2024-07-09 ASSESSMENT — ENCOUNTER SYMPTOMS
GASTROINTESTINAL NEGATIVE: 1
RESPIRATORY NEGATIVE: 1
BLOOD IN STOOL: 0
EYES NEGATIVE: 1
ALLERGIC/IMMUNOLOGIC NEGATIVE: 1
ABDOMINAL PAIN: 0
COUGH: 0
SHORTNESS OF BREATH: 0

## 2024-08-29 ENCOUNTER — HOSPITAL ENCOUNTER (OUTPATIENT)
Dept: MAMMOGRAPHY | Age: 54
Discharge: HOME | End: 2024-08-29
Attending: FAMILY MEDICINE
Payer: COMMERCIAL

## 2024-08-29 DIAGNOSIS — Z12.31 SCREENING MAMMOGRAM FOR HIGH-RISK PATIENT: ICD-10-CM

## 2024-08-29 PROCEDURE — 77063 BREAST TOMOSYNTHESIS BI: CPT

## 2024-11-14 DIAGNOSIS — E78.00 ELEVATED LDL CHOLESTEROL LEVEL: Primary | ICD-10-CM

## 2024-11-19 ENCOUNTER — LAB (OUTPATIENT)
Dept: FAMILY MEDICINE CLINIC | Facility: CLINIC | Age: 54
End: 2024-11-19

## 2024-11-19 DIAGNOSIS — E78.00 ELEVATED LDL CHOLESTEROL LEVEL: ICD-10-CM

## 2024-11-19 LAB
ALBUMIN SERPL-MCNC: 4 G/DL (ref 3.5–5)
ALBUMIN/GLOB SERPL: 1.3 (ref 1–1.9)
ALP SERPL-CCNC: 69 U/L (ref 35–104)
ALT SERPL-CCNC: 27 U/L (ref 8–45)
ANION GAP SERPL CALC-SCNC: 12 MMOL/L (ref 7–16)
AST SERPL-CCNC: 24 U/L (ref 15–37)
BILIRUB SERPL-MCNC: 1 MG/DL (ref 0–1.2)
BUN SERPL-MCNC: 11 MG/DL (ref 6–23)
CALCIUM SERPL-MCNC: 9.7 MG/DL (ref 8.8–10.2)
CHLORIDE SERPL-SCNC: 100 MMOL/L (ref 98–107)
CHOLEST SERPL-MCNC: 191 MG/DL (ref 0–200)
CO2 SERPL-SCNC: 27 MMOL/L (ref 20–29)
CREAT SERPL-MCNC: 0.75 MG/DL (ref 0.6–1.1)
GLOBULIN SER CALC-MCNC: 3.1 G/DL (ref 2.3–3.5)
GLUCOSE SERPL-MCNC: 106 MG/DL (ref 70–99)
HDLC SERPL-MCNC: 77 MG/DL (ref 40–60)
HDLC SERPL: 2.5 (ref 0–5)
LDLC SERPL CALC-MCNC: 99 MG/DL (ref 0–100)
POTASSIUM SERPL-SCNC: 4.2 MMOL/L (ref 3.5–5.1)
PROT SERPL-MCNC: 7.1 G/DL (ref 6.3–8.2)
SODIUM SERPL-SCNC: 139 MMOL/L (ref 136–145)
TRIGL SERPL-MCNC: 80 MG/DL (ref 0–150)
VLDLC SERPL CALC-MCNC: 16 MG/DL (ref 6–23)

## 2024-11-26 ENCOUNTER — OFFICE VISIT (OUTPATIENT)
Dept: FAMILY MEDICINE CLINIC | Facility: CLINIC | Age: 54
End: 2024-11-26
Payer: COMMERCIAL

## 2024-11-26 VITALS
SYSTOLIC BLOOD PRESSURE: 122 MMHG | HEART RATE: 97 BPM | BODY MASS INDEX: 45.93 KG/M2 | DIASTOLIC BLOOD PRESSURE: 82 MMHG | WEIGHT: 269 LBS | HEIGHT: 64 IN

## 2024-11-26 DIAGNOSIS — E66.01 OBESITY, MORBID: ICD-10-CM

## 2024-11-26 DIAGNOSIS — R73.9 ELEVATED BLOOD SUGAR: ICD-10-CM

## 2024-11-26 DIAGNOSIS — E78.00 ELEVATED CHOLESTEROL: ICD-10-CM

## 2024-11-26 DIAGNOSIS — H10.502 BLEPHAROCONJUNCTIVITIS OF LEFT EYE, UNSPECIFIED BLEPHAROCONJUNCTIVITIS TYPE: Primary | ICD-10-CM

## 2024-11-26 PROCEDURE — 99214 OFFICE O/P EST MOD 30 MIN: CPT | Performed by: FAMILY MEDICINE

## 2024-11-26 RX ORDER — OFLOXACIN 3 MG/ML
3 SOLUTION/ DROPS OPHTHALMIC 4 TIMES DAILY
Qty: 5 ML | Refills: 0 | Status: SHIPPED | OUTPATIENT
Start: 2024-11-26 | End: 2024-12-06

## 2024-11-26 ASSESSMENT — ENCOUNTER SYMPTOMS
COUGH: 0
EYE ITCHING: 0
EYE REDNESS: 1
PHOTOPHOBIA: 0
EYE PAIN: 0
WHEEZING: 0
SHORTNESS OF BREATH: 0
SORE THROAT: 0
RHINORRHEA: 0
EYE DISCHARGE: 0

## 2024-11-26 ASSESSMENT — VISUAL ACUITY: OU: 1

## 2024-11-26 NOTE — PROGRESS NOTES
PROGRESS NOTE    SUBJECTIVE:   Suma Soares is a 54 y.o. female seen for a follow up visit regarding the following chief complaint:     Chief Complaint   Patient presents with    Follow-up     labs    Other     Eyes irritated.            HPI patient presents office today for follow-up of her labs and complaining of left eye irritation conjunctivitis      Past Medical History, Past Surgical History, Family history, Social History, and Medications were all reviewed with the patient today and updated as necessary.       Current Outpatient Medications   Medication Sig Dispense Refill    ofloxacin (OCUFLOX) 0.3 % solution Place 3 drops into the left eye 4 times daily for 10 days 5 mL 0    Omega-3 Fatty Acids (FISH OIL) 1000 MG CAPS Take 1 capsule by mouth daily      vitamin D3 (CHOLECALCIFEROL) 10 MCG (400 UNIT) TABS tablet Take 1 tablet by mouth daily      cyanocobalamin 500 MCG tablet Take 1 tablet by mouth daily       No current facility-administered medications for this visit.     Allergies   Allergen Reactions    Latex Rash    Penicillins Other (See Comments)     Other reaction(s): Unknown-Unspecified  Unknown reaction.  Patient was a child when reaction occurred.      Sulfa Antibiotics Itching    Lavender Oil Itching and Rash     Patient Active Problem List   Diagnosis    Otitis media with effusion    Cough    S/P hysterectomy    Obesity, morbid    Venous ulcer (HCC)    Body mass index (BMI) 45.0-49.9, adult     Past Medical History:   Diagnosis Date    Abnormal Papanicolaou smear of cervix     Cough 08/17/2016    Drug effect     Allergic to sukfa antibiotics, penicllin, and latex    Gilbert's syndrome     elevated bilirubin levels    Hard of hearing     Headache     HPV (human papilloma virus) infection     Hypersomnia     Hypertension     Pt denies, states family hx only    Leg ulcer, left (HCC)     Ankle- treated by wound clinic     Morbid obesity     Narcolepsy without cataplexy(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

## 2025-06-06 ENCOUNTER — LAB (OUTPATIENT)
Dept: FAMILY MEDICINE CLINIC | Facility: CLINIC | Age: 55
End: 2025-06-06
Payer: COMMERCIAL

## 2025-06-06 DIAGNOSIS — E55.9 VITAMIN D DEFICIENCY: ICD-10-CM

## 2025-06-06 DIAGNOSIS — Z00.00 LABORATORY EXAM ORDERED AS PART OF ROUTINE GENERAL MEDICAL EXAMINATION: Primary | ICD-10-CM

## 2025-06-06 LAB
25(OH)D3 SERPL-MCNC: 48.2 NG/ML (ref 30–100)
ALBUMIN SERPL-MCNC: 3.9 G/DL (ref 3.5–5)
ALBUMIN/GLOB SERPL: 1.1 (ref 1–1.9)
ALP SERPL-CCNC: 72 U/L (ref 35–104)
ALT SERPL-CCNC: 33 U/L (ref 8–45)
ANION GAP SERPL CALC-SCNC: 14 MMOL/L (ref 7–16)
AST SERPL-CCNC: 27 U/L (ref 15–37)
BASOPHILS # BLD: 0.02 K/UL (ref 0–0.2)
BASOPHILS NFR BLD: 0.4 % (ref 0–2)
BILIRUB SERPL-MCNC: 1 MG/DL (ref 0–1.2)
BILIRUBIN, URINE, POC: NEGATIVE
BLOOD URINE, POC: NEGATIVE
BUN SERPL-MCNC: 10 MG/DL (ref 6–23)
CALCIUM SERPL-MCNC: 10 MG/DL (ref 8.8–10.2)
CHLORIDE SERPL-SCNC: 102 MMOL/L (ref 98–107)
CHOLEST SERPL-MCNC: 195 MG/DL (ref 0–200)
CO2 SERPL-SCNC: 25 MMOL/L (ref 20–29)
CREAT SERPL-MCNC: 0.77 MG/DL (ref 0.6–1.1)
DIFFERENTIAL METHOD BLD: NORMAL
EOSINOPHIL # BLD: 0.13 K/UL (ref 0–0.8)
EOSINOPHIL NFR BLD: 2.3 % (ref 0.5–7.8)
ERYTHROCYTE [DISTWIDTH] IN BLOOD BY AUTOMATED COUNT: 14 % (ref 11.9–14.6)
GLOBULIN SER CALC-MCNC: 3.5 G/DL (ref 2.3–3.5)
GLUCOSE SERPL-MCNC: 98 MG/DL (ref 70–99)
GLUCOSE URINE, POC: NEGATIVE
HCT VFR BLD AUTO: 42.3 % (ref 35.8–46.3)
HDLC SERPL-MCNC: 70 MG/DL (ref 40–60)
HDLC SERPL: 2.8 (ref 0–5)
HGB BLD-MCNC: 14.3 G/DL (ref 11.7–15.4)
IMM GRANULOCYTES # BLD AUTO: 0.01 K/UL (ref 0–0.5)
IMM GRANULOCYTES NFR BLD AUTO: 0.2 % (ref 0–5)
KETONES, URINE, POC: NEGATIVE
LDLC SERPL CALC-MCNC: 106 MG/DL (ref 0–100)
LEUKOCYTE ESTERASE, URINE, POC: NEGATIVE
LYMPHOCYTES # BLD: 2.37 K/UL (ref 0.5–4.6)
LYMPHOCYTES NFR BLD: 41.5 % (ref 13–44)
MCH RBC QN AUTO: 29.1 PG (ref 26.1–32.9)
MCHC RBC AUTO-ENTMCNC: 33.8 G/DL (ref 31.4–35)
MCV RBC AUTO: 86.2 FL (ref 82–102)
MONOCYTES # BLD: 0.34 K/UL (ref 0.1–1.3)
MONOCYTES NFR BLD: 6 % (ref 4–12)
NEUTS SEG # BLD: 2.84 K/UL (ref 1.7–8.2)
NEUTS SEG NFR BLD: 49.6 % (ref 43–78)
NITRITE, URINE, POC: NEGATIVE
NRBC # BLD: 0 K/UL (ref 0–0.2)
PH, URINE, POC: 6.5 (ref 4.6–8)
PLATELET # BLD AUTO: 268 K/UL (ref 150–450)
PMV BLD AUTO: 10.5 FL (ref 9.4–12.3)
POTASSIUM SERPL-SCNC: 4.7 MMOL/L (ref 3.5–5.1)
PROT SERPL-MCNC: 7.5 G/DL (ref 6.3–8.2)
PROTEIN,URINE, POC: NEGATIVE
RBC # BLD AUTO: 4.91 M/UL (ref 4.05–5.2)
SODIUM SERPL-SCNC: 141 MMOL/L (ref 136–145)
SPECIFIC GRAVITY, URINE, POC: 1.02 (ref 1–1.03)
TRIGL SERPL-MCNC: 97 MG/DL (ref 0–150)
TSH W FREE THYROID IF ABNORMAL: 2.54 UIU/ML (ref 0.27–4.2)
URINALYSIS CLARITY, POC: CLEAR
URINALYSIS COLOR, POC: YELLOW
UROBILINOGEN, POC: NORMAL
VLDLC SERPL CALC-MCNC: 19 MG/DL (ref 6–23)
WBC # BLD AUTO: 5.7 K/UL (ref 4.3–11.1)

## 2025-06-06 PROCEDURE — 81003 URINALYSIS AUTO W/O SCOPE: CPT | Performed by: FAMILY MEDICINE

## 2025-06-16 ASSESSMENT — PATIENT HEALTH QUESTIONNAIRE - PHQ9
1. LITTLE INTEREST OR PLEASURE IN DOING THINGS: NOT AT ALL
SUM OF ALL RESPONSES TO PHQ QUESTIONS 1-9: 0
SUM OF ALL RESPONSES TO PHQ QUESTIONS 1-9: 0
2. FEELING DOWN, DEPRESSED OR HOPELESS: NOT AT ALL
2. FEELING DOWN, DEPRESSED OR HOPELESS: NOT AT ALL
1. LITTLE INTEREST OR PLEASURE IN DOING THINGS: NOT AT ALL
SUM OF ALL RESPONSES TO PHQ QUESTIONS 1-9: 0
SUM OF ALL RESPONSES TO PHQ QUESTIONS 1-9: 0
SUM OF ALL RESPONSES TO PHQ9 QUESTIONS 1 & 2: 0

## 2025-06-19 ENCOUNTER — OFFICE VISIT (OUTPATIENT)
Dept: FAMILY MEDICINE CLINIC | Facility: CLINIC | Age: 55
End: 2025-06-19
Payer: COMMERCIAL

## 2025-06-19 VITALS
HEIGHT: 64 IN | BODY MASS INDEX: 45.75 KG/M2 | SYSTOLIC BLOOD PRESSURE: 108 MMHG | DIASTOLIC BLOOD PRESSURE: 74 MMHG | HEART RATE: 88 BPM | WEIGHT: 268 LBS

## 2025-06-19 DIAGNOSIS — R73.9 ELEVATED BLOOD SUGAR: ICD-10-CM

## 2025-06-19 DIAGNOSIS — Z13.31 SCREENING FOR DEPRESSION: ICD-10-CM

## 2025-06-19 DIAGNOSIS — I83.009 VENOUS ULCER (HCC): ICD-10-CM

## 2025-06-19 DIAGNOSIS — Z00.00 ROUTINE GENERAL MEDICAL EXAMINATION AT A HEALTH CARE FACILITY: Primary | ICD-10-CM

## 2025-06-19 DIAGNOSIS — E66.01 OBESITY, MORBID (HCC): ICD-10-CM

## 2025-06-19 DIAGNOSIS — L97.909 VENOUS ULCER (HCC): ICD-10-CM

## 2025-06-19 DIAGNOSIS — E78.00 ELEVATED CHOLESTEROL: ICD-10-CM

## 2025-06-19 PROCEDURE — 99396 PREV VISIT EST AGE 40-64: CPT | Performed by: FAMILY MEDICINE

## 2025-06-19 RX ORDER — MODAFINIL 200 MG/1
TABLET ORAL
COMMUNITY
End: 2025-06-19

## 2025-06-19 RX ORDER — LEVONORGESTREL AND ETHINYL ESTRADIOL 6-5-10
KIT ORAL
COMMUNITY
End: 2025-06-19 | Stop reason: CLARIF

## 2025-06-19 SDOH — ECONOMIC STABILITY: FOOD INSECURITY: WITHIN THE PAST 12 MONTHS, YOU WORRIED THAT YOUR FOOD WOULD RUN OUT BEFORE YOU GOT MONEY TO BUY MORE.: NEVER TRUE

## 2025-06-19 SDOH — ECONOMIC STABILITY: FOOD INSECURITY: WITHIN THE PAST 12 MONTHS, THE FOOD YOU BOUGHT JUST DIDN'T LAST AND YOU DIDN'T HAVE MONEY TO GET MORE.: NEVER TRUE

## 2025-06-19 ASSESSMENT — ENCOUNTER SYMPTOMS
ABDOMINAL PAIN: 0
COUGH: 0
SHORTNESS OF BREATH: 0

## 2025-06-19 NOTE — PROGRESS NOTES
PROGRESS NOTE    SUBJECTIVE:   Suma Soares is a 55 y.o. female seen for a follow up visit regarding the following chief complaint:     Chief Complaint   Patient presents with    Annual Exam           HPI patient presents the office today for complete physical without complaints      Past Medical History, Past Surgical History, Family history, Social History, and Medications were all reviewed with the patient today and updated as necessary.       Current Outpatient Medications   Medication Sig Dispense Refill    polyethyl glycol-propyl glycol 0.4-0.3 % (SYSTANE) 0.4-0.3 % ophthalmic solution 1 drop as needed for Dry Eyes      Omega-3 Fatty Acids (FISH OIL) 1000 MG CAPS Take 1 capsule by mouth daily      vitamin D3 (CHOLECALCIFEROL) 10 MCG (400 UNIT) TABS tablet Take 1 tablet by mouth daily      cyanocobalamin 500 MCG tablet Take 1 tablet by mouth daily      modafinil (PROVIGIL) 200 MG tablet 1 tab(s) orally 1 in the am and 1/2 at noon for 30 day(s)       No current facility-administered medications for this visit.     Allergies   Allergen Reactions    Latex Rash    Penicillins Other (See Comments)     Other reaction(s): Unknown-Unspecified  Unknown reaction.  Patient was a child when reaction occurred.      Sulfa Antibiotics Itching    Lavender Oil Itching and Rash     Patient Active Problem List   Diagnosis    Otitis media with effusion    Cough    S/P hysterectomy    Obesity, morbid (HCC)    Venous ulcer (HCC)    Body mass index (BMI) 45.0-49.9, adult ([facility])     Past Medical History:   Diagnosis Date    Abnormal Papanicolaou smear of cervix     Cough 08/17/2016    Drug effect     Allergic to sukfa antibiotics, penicllin, and latex    Gilbert's syndrome     elevated bilirubin levels    Hard of hearing     Headache     HPV (human papilloma virus) infection     Hypersomnia     Hypertension     Pt denies, states family hx only    Leg ulcer, left (HCC)     Ankle- treated by wound clinic     Morbid obesity ([facility])

## 2025-07-22 ENCOUNTER — OFFICE VISIT (OUTPATIENT)
Dept: OBGYN CLINIC | Age: 55
End: 2025-07-22
Payer: COMMERCIAL

## 2025-07-22 VITALS
HEIGHT: 64 IN | WEIGHT: 276 LBS | DIASTOLIC BLOOD PRESSURE: 74 MMHG | BODY MASS INDEX: 47.12 KG/M2 | SYSTOLIC BLOOD PRESSURE: 132 MMHG

## 2025-07-22 DIAGNOSIS — E66.1 CLASS 3 DRUG-INDUCED OBESITY WITHOUT SERIOUS COMORBIDITY WITH BODY MASS INDEX (BMI) OF 45.0 TO 49.9 IN ADULT (HCC): ICD-10-CM

## 2025-07-22 DIAGNOSIS — Z01.419 WELL WOMAN EXAM WITH ROUTINE GYNECOLOGICAL EXAM: Primary | ICD-10-CM

## 2025-07-22 DIAGNOSIS — Z12.31 BREAST CANCER SCREENING BY MAMMOGRAM: ICD-10-CM

## 2025-07-22 DIAGNOSIS — E66.813 CLASS 3 DRUG-INDUCED OBESITY WITHOUT SERIOUS COMORBIDITY WITH BODY MASS INDEX (BMI) OF 45.0 TO 49.9 IN ADULT (HCC): ICD-10-CM

## 2025-07-22 PROCEDURE — 99396 PREV VISIT EST AGE 40-64: CPT | Performed by: OBSTETRICS & GYNECOLOGY

## 2025-07-22 PROCEDURE — 99401 PREV MED CNSL INDIV APPRX 15: CPT | Performed by: OBSTETRICS & GYNECOLOGY

## 2025-07-22 PROCEDURE — 99459 PELVIC EXAMINATION: CPT | Performed by: OBSTETRICS & GYNECOLOGY

## 2025-07-22 RX ORDER — POVIDONE, POLYVINYL ALCOHOL 20; 27 G/1000ML; G/1000ML
SOLUTION OPHTHALMIC
COMMUNITY
Start: 2025-07-17

## 2025-07-22 ASSESSMENT — ENCOUNTER SYMPTOMS
SHORTNESS OF BREATH: 0
ABDOMINAL PAIN: 0
GASTROINTESTINAL NEGATIVE: 1
COUGH: 0
EYES NEGATIVE: 1
RESPIRATORY NEGATIVE: 1
BLOOD IN STOOL: 0
ALLERGIC/IMMUNOLOGIC NEGATIVE: 1

## 2025-07-22 NOTE — PROGRESS NOTES
her PCP.    Review of Systems   Constitutional: Negative.  Negative for unexpected weight change.   HENT: Negative.     Eyes: Negative.    Respiratory: Negative.  Negative for cough and shortness of breath.    Cardiovascular: Negative.  Negative for chest pain and palpitations.   Gastrointestinal: Negative.  Negative for abdominal pain and blood in stool.   Endocrine: Negative.    Genitourinary: Negative.  Negative for difficulty urinating, dysuria, menstrual problem, pelvic pain and urgency.   Musculoskeletal: Negative.    Skin: Negative.    Allergic/Immunologic: Negative.    Neurological: Negative.    Hematological: Negative.    Psychiatric/Behavioral: Negative.  Negative for behavioral problems and confusion.    All other systems reviewed and are negative.      No results found for this visit on 07/22/25.   Blood pressure 132/74, height 1.626 m (5' 4\"), weight 125.2 kg (276 lb).   Body mass index is 47.38 kg/m².   Wt Readings from Last 3 Encounters:   07/22/25 125.2 kg (276 lb)   06/19/25 121.6 kg (268 lb)   11/26/24 122 kg (269 lb)      Physical Exam  Vitals reviewed. Exam conducted with a chaperone present.   Constitutional:       General: She is not in acute distress.     Appearance: Normal appearance.   HENT:      Head: Normocephalic and atraumatic.   Eyes:      Extraocular Movements: Extraocular movements intact.      Pupils: Pupils are equal, round, and reactive to light.   Chest:   Breasts:     Breasts are symmetrical.      Right: Normal. No bleeding, inverted nipple, mass, nipple discharge or skin change.      Left: Normal. No bleeding, inverted nipple, mass, nipple discharge or skin change.   Abdominal:      General: Abdomen is flat. There is no distension.      Palpations: Abdomen is soft. There is no mass.      Tenderness: There is no abdominal tenderness. There is no guarding or rebound.      Hernia: No hernia is present. There is no hernia in the left inguinal area or right inguinal area.

## 2025-08-30 ENCOUNTER — HOSPITAL ENCOUNTER (OUTPATIENT)
Dept: MAMMOGRAPHY | Age: 55
Discharge: HOME | End: 2025-09-02
Payer: COMMERCIAL

## 2025-08-30 DIAGNOSIS — Z12.31 BREAST CANCER SCREENING BY MAMMOGRAM: ICD-10-CM

## 2025-08-30 PROCEDURE — 77063 BREAST TOMOSYNTHESIS BI: CPT

## (undated) DEVICE — SHEARS ENDOSCP L36CM DIA5MM ULTRASONIC CRV TIP HARM

## (undated) DEVICE — BLLN OCCL PERITONM COLPOPNEUMO --

## (undated) DEVICE — 1200 GUARD II KIT W/5MM TUBE W/O VAC TUBE: Brand: GUARDIAN

## (undated) DEVICE — REM POLYHESIVE ADULT PATIENT RETURN ELECTRODE: Brand: VALLEYLAB

## (undated) DEVICE — SOLUTION IV 1000ML 0.9% SOD CHL

## (undated) DEVICE — SUT PLN 2-0 27IN CT1 YEL --

## (undated) DEVICE — KIT,ANTI FOG,W/SPONGE & FLUID,SOFT PACK: Brand: MEDLINE

## (undated) DEVICE — TROCAR: Brand: KII® SLEEVE

## (undated) DEVICE — TOTAL 1-LAYER TRAY, LATEX FOLEY, 16FR 10: Brand: MEDLINE

## (undated) DEVICE — SOLUTION IRRIG 3000ML 0.9% SOD CHL FLX CONT 0797208] ICU MEDICAL INC]

## (undated) DEVICE — SYR 10ML LUER LOK 1/5ML GRAD --

## (undated) DEVICE — INTENDED FOR TISSUE SEPARATION, AND OTHER PROCEDURES THAT REQUIRE A SHARP SURGICAL BLADE TO PUNCTURE OR CUT.: Brand: BARD-PARKER ® STAINLESS STEEL BLADES

## (undated) DEVICE — NEEDLE SYR 18GA L1.5IN RED PLAS HUB S STL BLNT FILL W/O

## (undated) DEVICE — AIRLIFE™ OXYGEN TUBING 7 FEET (2.1 M) CRUSH RESISTANT OXYGEN TUBING, VINYL TIPPED: Brand: AIRLIFE™

## (undated) DEVICE — GOWN,REINF,POLY,ECL,PP SLV,XL: Brand: MEDLINE

## (undated) DEVICE — SUTURE VCRL SZ 4-0 L27IN ABSRB UD L19MM PS-2 3/8 CIR PRIM J426H

## (undated) DEVICE — CONTAINER SPEC HISTOLOGY 900ML POLYPR

## (undated) DEVICE — TRAY PREP DRY W/ PREM GLV 2 APPL 6 SPNG 2 UNDPD 1 OVERWRAP

## (undated) DEVICE — CARDINAL HEALTH FLEXIBLE LIGHT HANDLE COVER: Brand: CARDINAL HEALTH

## (undated) DEVICE — GYN LAPAROSCOPY: Brand: MEDLINE INDUSTRIES, INC.

## (undated) DEVICE — LUBE JELLY FOIL PACK 1.4 OZ: Brand: MEDLINE INDUSTRIES, INC.

## (undated) DEVICE — INSUFFLATION NEEDLE TO ESTABLISH PNEUMOPERITONEUM.: Brand: INSUFFLATION NEEDLE

## (undated) DEVICE — AGENT HEMSTAT 5GM ARISTA AH

## (undated) DEVICE — BLADE HARM SCALP HK DISSECTING --

## (undated) DEVICE — YANKAUER,BULB TIP,W/O VENT,RIGID,STERILE: Brand: MEDLINE

## (undated) DEVICE — SINGLE PORT MANIFOLD: Brand: NEPTUNE 2

## (undated) DEVICE — SYR 50ML LR LCK 1ML GRAD NSAF --

## (undated) DEVICE — FILTER SMK EVAC FLO CLR MEGADYNE

## (undated) DEVICE — KENDALL RADIOLUCENT FOAM MONITORING ELECTRODE RECTANGULAR SHAPE: Brand: KENDALL

## (undated) DEVICE — DERMABOND SKIN ADH 0.7ML -- DERMABOND ADVANCED 12/BX

## (undated) DEVICE — SEALER LAP L37CM SHFT DIA10MM TISS FUS HAND/FOOT SWCH BLNT

## (undated) DEVICE — DISSECTOR ENDOSCP TIP DIA10MM BLNT ENDOPATH

## (undated) DEVICE — TIP IU L8CM DIA6.7MM BLU SIL FLX DISP RUMI II

## (undated) DEVICE — THE TORRENT IRRIGATION TUBING IS INTENDED TO PROVIDE IRRIGATION VIA IRRIGATION FLUIDS, SUCH AS STERILE WATER, DURING GASTROINTESTINAL ENDOSCOPIC PROCEDURES WHEN USED IN CONJUNCTION WITH AN IRRIGATION PUMP OR ELECTROSURGICAL UNIT.: Brand: TORRENT

## (undated) DEVICE — PREP CHLORAPRP 10.5ML ORG STRL --

## (undated) DEVICE — SYRINGE MED 3ML CLR PLAS STD N CTRL LUERLOCK TIP DISP

## (undated) DEVICE — LOGICUT SCISSOR LENGTH 320MM: Brand: LOGI - LAPAROSCOPIC INSTRUMENT SYSTEM

## (undated) DEVICE — GAUZE,SPONGE,4"X4",12PLY,WOVEN,NS,LF: Brand: MEDLINE

## (undated) DEVICE — APPLIER CLP M L L11.4IN DIA10MM ENDOSCP ROT MULT FOR LIG

## (undated) DEVICE — SYRINGE, LUER SLIP, STERILE, 60ML: Brand: MEDLINE

## (undated) DEVICE — TROCAR: Brand: KII SHIELDED BLADED ACCESS SYSTEM

## (undated) DEVICE — DRAPE TWL SURG 16X26IN BLU ORB04] ALLCARE INC]

## (undated) DEVICE — PAD MATERNITY 11IN W/TAILS -- STRL

## (undated) DEVICE — GARMENT,MEDLINE,DVT,INT,CALF,MED, GEN2: Brand: MEDLINE

## (undated) DEVICE — APPLICATOR SURG XL L38CM FOR ARISTA ABSRB HEMSTAT FLEXITIP

## (undated) DEVICE — SYRINGE MED 10ML LUERLOCK TIP W/O SFTY DISP

## (undated) DEVICE — TRAY CATH 16F DRN BG LTX -- CONVERT TO ITEM 363158

## (undated) DEVICE — 2, DISPOSABLE SUCTION/IRRIGATOR WITHOUT DISPOSABLE TIP: Brand: STRYKEFLOW

## (undated) DEVICE — SINGLE BASIN: Brand: CARDINAL HEALTH

## (undated) DEVICE — SHEARS ENDOSCP L36CM DIA5MM ULTRASONIC CRV TIP W/ ADV

## (undated) DEVICE — (D)PREP SKN CHLRAPRP APPL 26ML -- CONVERT TO ITEM 371833

## (undated) DEVICE — DEVICE RELD SZ 0 L8IN GRN ABSRB FOR ENDO SILS STIT DEVS

## (undated) DEVICE — SUTURE ABSORBABLE BRAIDED 0 CT-1 8X27 IN UD VICRYL JJ41G

## (undated) DEVICE — CATHETER URETH 16FR BLLN 5CC L16IN SIL F INDWL 2 W SHT LEN

## (undated) DEVICE — SUTURING DEVICE: Brand: ENDO STITCH

## (undated) DEVICE — SUTURE VCRL SZ 0 L36IN ABSRB UD L36MM CT-1 1/2 CIR J946H

## (undated) DEVICE — JELLY LUBRICATING 10GM PREFIL SYR LUBE

## (undated) DEVICE — CONNECTOR TBNG OD5-7MM O2 END DISP

## (undated) DEVICE — SYRINGE IRRIG 60ML SFT PLIABLE BLB EZ TO GRP 1 HND USE W/

## (undated) DEVICE — CANNULA NSL ORAL AD FOR CAPNOFLEX CO2 O2 AIRLFE

## (undated) DEVICE — SUTURE VCRL SZ 4-0 L18IN ABSRB UD L19MM PS-2 3/8 CIR PRIM J496H

## (undated) DEVICE — 40585 XL ADVANCED TRENDELENBURG POSITIONING KIT: Brand: 40585 XL ADVANCED TRENDELENBURG POSITIONING KIT

## (undated) DEVICE — LAPAROSCOPIC TROCAR SLEEVE/SINGLE USE: Brand: KII® OPTICAL ACCESS SYSTEM

## (undated) DEVICE — BUTTON SWITCH PENCIL BLADE ELECTRODE, HOLSTER: Brand: EDGE

## (undated) DEVICE — 2000CC GUARDIAN II: Brand: GUARDIAN

## (undated) DEVICE — DRAPE,UNDERBUTTOCKS,PCH,STERILE: Brand: MEDLINE

## (undated) DEVICE — [HIGH FLOW INSUFFLATOR,  DO NOT USE IF PACKAGE IS DAMAGED,  KEEP DRY,  KEEP AWAY FROM SUNLIGHT,  PROTECT FROM HEAT AND RADIOACTIVE SOURCES.]: Brand: PNEUMOSURE

## (undated) DEVICE — ACCESS PLATFORM FOR MINIMALLY INVASIVE SURGERY: Brand: GELPOINT® ADVANCED ACCESS PLATFORM